# Patient Record
Sex: FEMALE | Race: BLACK OR AFRICAN AMERICAN | NOT HISPANIC OR LATINO | Employment: FULL TIME | URBAN - METROPOLITAN AREA
[De-identification: names, ages, dates, MRNs, and addresses within clinical notes are randomized per-mention and may not be internally consistent; named-entity substitution may affect disease eponyms.]

---

## 2020-11-27 ENCOUNTER — TELEPHONE (OUTPATIENT)
Dept: OBSTETRICS AND GYNECOLOGY | Facility: CLINIC | Age: 40
End: 2020-11-27

## 2020-11-27 ENCOUNTER — HOSPITAL ENCOUNTER (EMERGENCY)
Facility: HOSPITAL | Age: 40
Discharge: HOME | End: 2020-11-27
Attending: EMERGENCY MEDICINE
Payer: MEDICAID

## 2020-11-27 VITALS
OXYGEN SATURATION: 100 % | HEART RATE: 72 BPM | HEIGHT: 70 IN | RESPIRATION RATE: 18 BRPM | TEMPERATURE: 99 F | BODY MASS INDEX: 41.95 KG/M2 | WEIGHT: 293 LBS | DIASTOLIC BLOOD PRESSURE: 79 MMHG | SYSTOLIC BLOOD PRESSURE: 136 MMHG

## 2020-11-27 DIAGNOSIS — N93.9 VAGINAL BLEEDING: Primary | ICD-10-CM

## 2020-11-27 LAB
B-HCG UR QL: NEGATIVE
B-HCG UR QL: NEGATIVE
BACTERIA #/AREA URNS AUTO: ABNORMAL /HPF
BASOPHILS # BLD AUTO: 0.03 K/UL (ref 0–0.2)
BASOPHILS NFR BLD: 0.3 % (ref 0–1.9)
BILIRUB UR QL STRIP: NEGATIVE
CLARITY UR REFRACT.AUTO: ABNORMAL
COLOR UR AUTO: YELLOW
CTP QC/QA: YES
DIFFERENTIAL METHOD: ABNORMAL
EOSINOPHIL # BLD AUTO: 0.1 K/UL (ref 0–0.5)
EOSINOPHIL NFR BLD: 1.5 % (ref 0–8)
ERYTHROCYTE [DISTWIDTH] IN BLOOD BY AUTOMATED COUNT: 16.4 % (ref 11.5–14.5)
GLUCOSE UR QL STRIP: NEGATIVE
HCT VFR BLD AUTO: 30.6 % (ref 37–48.5)
HGB BLD-MCNC: 8.3 G/DL (ref 12–16)
HGB UR QL STRIP: ABNORMAL
HYALINE CASTS UR QL AUTO: 2 /LPF
IMM GRANULOCYTES # BLD AUTO: 0.03 K/UL (ref 0–0.04)
IMM GRANULOCYTES NFR BLD AUTO: 0.3 % (ref 0–0.5)
KETONES UR QL STRIP: NEGATIVE
LEUKOCYTE ESTERASE UR QL STRIP: NEGATIVE
LYMPHOCYTES # BLD AUTO: 1.7 K/UL (ref 1–4.8)
LYMPHOCYTES NFR BLD: 19 % (ref 18–48)
MCH RBC QN AUTO: 21.3 PG (ref 27–31)
MCHC RBC AUTO-ENTMCNC: 27.1 G/DL (ref 32–36)
MCV RBC AUTO: 79 FL (ref 82–98)
MICROSCOPIC COMMENT: ABNORMAL
MONOCYTES # BLD AUTO: 0.8 K/UL (ref 0.3–1)
MONOCYTES NFR BLD: 8.2 % (ref 4–15)
NEUTROPHILS # BLD AUTO: 6.5 K/UL (ref 1.8–7.7)
NEUTROPHILS NFR BLD: 70.7 % (ref 38–73)
NITRITE UR QL STRIP: NEGATIVE
NRBC BLD-RTO: 0 /100 WBC
PH UR STRIP: 6 [PH] (ref 5–8)
PLATELET # BLD AUTO: 374 K/UL (ref 150–350)
PMV BLD AUTO: 10.3 FL (ref 9.2–12.9)
PROT UR QL STRIP: NEGATIVE
RBC # BLD AUTO: 3.9 M/UL (ref 4–5.4)
RBC #/AREA URNS AUTO: >100 /HPF (ref 0–4)
SP GR UR STRIP: 1.02 (ref 1–1.03)
SQUAMOUS #/AREA URNS AUTO: 0 /HPF
URN SPEC COLLECT METH UR: ABNORMAL
WBC # BLD AUTO: 9.18 K/UL (ref 3.9–12.7)

## 2020-11-27 PROCEDURE — 99284 EMERGENCY DEPT VISIT MOD MDM: CPT | Mod: ,,, | Performed by: EMERGENCY MEDICINE

## 2020-11-27 PROCEDURE — 81025 URINE PREGNANCY TEST: CPT | Performed by: PHYSICIAN ASSISTANT

## 2020-11-27 PROCEDURE — 81001 URINALYSIS AUTO W/SCOPE: CPT

## 2020-11-27 PROCEDURE — 81025 URINE PREGNANCY TEST: CPT

## 2020-11-27 PROCEDURE — 85025 COMPLETE CBC W/AUTO DIFF WBC: CPT

## 2020-11-27 PROCEDURE — 99284 PR EMERGENCY DEPT VISIT,LEVEL IV: ICD-10-PCS | Mod: ,,, | Performed by: EMERGENCY MEDICINE

## 2020-11-27 PROCEDURE — 99283 EMERGENCY DEPT VISIT LOW MDM: CPT

## 2020-11-27 NOTE — TELEPHONE ENCOUNTER
I spoke with pt and she states she is a prior pt of Dr. Will (EJ). She says she went to the ER yesterday due to soaking 2+ pads within an hour, her labs came back normal so pt was sent home. She states while under Dr. Will care back in May 2020, she was suppose to get a DnC and IUD insert. Due to Covid she never had it done. I asked pt if she'd like to schedule an appt with Suman but she asked me to send a message first and to have Dr. Will call her. I told her Dr. Will wouldn't receive the msg until next week, when she returns to clinic.         ----- Message from Jazzy Giang sent at 11/27/2020  8:03 AM CST -----  Contact: [phone]  Type:  Needs Medical Advice    Who Called: PT  Reason for Call: pt would like to schedule a New Pt appt for today if Possible for Vaginal Bleeding, pt was a pt of the Dr's at [facility]. Pt states if the Dr can not see her today can the Dr call In something to help stop the Bleeding until she can see the Dr. Pt states she went to the ER Yesterday   Would the patient rather a call back or a response via [facility]?  Call back  Best Call Back Number: [phone]

## 2020-11-27 NOTE — ED PROVIDER NOTES
Encounter Date: 11/27/2020       History     Chief Complaint   Patient presents with    Vaginal Bleeding     seen at [facility] yest, lab was normal, still bleeding      This is a  40-year-old female who presents to the emergency department today with vaginal bleeding.  She states that since her last child was born a couple of years ago she has had ongoing difficulties with heavy prolonged frequent menstrual periods.  She has followed up with an OBGYN for this in the past and has had workup that included ultrasound.  There was discussion of performing AD and C earlier in the year, however that has not yet occurred.  She is here today as she is had particularly worse vaginal bleeding over the past 48 hr.  She is going through multiple tampons and pads per day, passing clots. She was seen yesterday at [facility] where she states she had hematocrit drawn.  I was able to review Care everywhere and see that this was 31.6.   She presents today as she discussed her continued bleeding with the OBGYN nurse who directed her to the ED for further evaluation.  She is not feeling lightheaded.  She has no chest pain or shortness of breath.  No syncopal episodes.        Review of patient's allergies indicates:  No Known Allergies  No past medical history on file.  No past surgical history on file.  No family history on file.  Social History     Tobacco Use    Smoking status: Not on file   Substance Use Topics    Alcohol use: Not on file    Drug use: Not on file     Review of Systems   Constitutional: Negative for chills and fever.   HENT: Negative for congestion, rhinorrhea and sore throat.    Eyes: Negative for visual disturbance.   Respiratory: Negative for chest tightness and shortness of breath.    Cardiovascular: Negative for chest pain.   Gastrointestinal: Negative for abdominal pain, diarrhea, nausea and vomiting.   Genitourinary: Positive for vaginal bleeding. Negative for dysuria.   Musculoskeletal:  Negative for back pain.   Skin: Negative for rash.   Neurological: Negative for syncope and light-headedness.   Psychiatric/Behavioral: Negative for confusion.       Physical Exam     Initial Vitals [11/27/20 1138]   BP Pulse Resp Temp SpO2   (!) 189/94 99 18 98.7 °F (37.1 °C) 100 %      MAP       --         Physical Exam    Vitals reviewed.  Constitutional: She appears well-developed and well-nourished.   HENT:   Head: Normocephalic and atraumatic.   Mouth/Throat: No oropharyngeal exudate.   Eyes: EOM are normal. Pupils are equal, round, and reactive to light.   Neck: Normal range of motion. Neck supple.   Cardiovascular: Normal rate, regular rhythm, normal heart sounds and intact distal pulses. Exam reveals no gallop and no friction rub.    No murmur heard.  Pulmonary/Chest: Breath sounds normal. No respiratory distress. She has no wheezes. She has no rhonchi. She has no rales.   Abdominal: Soft. Bowel sounds are normal. She exhibits no distension. There is no abdominal tenderness. There is no rebound and no guarding.   Genitourinary:    Genitourinary Comments: Pelvic exam chaperoned by nurse reveals vaginal bleeding that is moderate in vault. No lesions seen. No clot at this time. Os closed and normal appearing.     Musculoskeletal: Normal range of motion.      Comments: No CVA tenderness bilaterally.   Skin: Skin is warm and dry. No rash noted.   Psychiatric: She has a normal mood and affect.         ED Course   Procedures  Labs Reviewed   CBC W/ AUTO DIFFERENTIAL - Abnormal; Notable for the following components:       Result Value    RBC 3.90 (*)     Hemoglobin 8.3 (*)     Hematocrit 30.6 (*)     MCV 79 (*)     MCH 21.3 (*)     MCHC 27.1 (*)     RDW 16.4 (*)     Platelets 374 (*)     All other components within normal limits   URINALYSIS, REFLEX TO URINE CULTURE - Abnormal; Notable for the following components:    Appearance, UA Hazy (*)     Occult Blood UA 3+ (*)     All other components within normal limits     Narrative:     Specimen Source->Urine   URINALYSIS MICROSCOPIC - Abnormal; Notable for the following components:    RBC, UA >100 (*)     Bacteria Few (*)     Hyaline Casts, UA 2 (*)     All other components within normal limits    Narrative:     Specimen Source->Urine   PREGNANCY TEST, URINE RAPID    Narrative:     Specimen Source->Urine   POCT URINE PREGNANCY          Imaging Results    None          Medical Decision Making:   History:   Old Medical Records: I decided to obtain old medical records.  Old Records Summarized: records from clinic visits.       <> Summary of Records: As per HPI.  Initial Assessment:   This is a 40-year-old female who presented to the emergency department today with dysfunctional uterine bleeding.  The patient was seen yesterday as well for the same complaints.  I have reviewed her workup at the outside facility in the [facility] everywhere area of epic.  I did note that her hematocrit was 31.6.  Her pregnancy test was negative.  Here on initial exam, I performed a chaperoned pelvic exam to characterize the amount of bleeding present and to assure that there was no clot at the cervical os or anything I could clear that would hopefully stop or decrease the bleeding present.  There was a moderate amount of blood in the vault but no clot at this time.  There is no lesion that I can identify.  I went ahead and anastasia a serial hematocrit with a CBC and begin conversation with her OBGYN Dr. Will.    Differential Diagnosis:   My differential for this patient's dysfunctional uterine bleeding is vast.  She certainly could have a fibroid that could be leading to this bleeding or a thickened endometrium.  There certainly could be hormonal changes that could have led to her irregular bleeding.  It appears upon my conversation with her that she has already begun this workup as an outpatient and has had ultrasounds in the past and a plan for D and C that had not yet occurred.  Thus my goal today was to  assure that her hematocrit was relatively stable and that she was in stable condition for discharge with close follow-up with her OBGYN for next week to determine more definitive action to treat this dysfunctional uterine bleeding.  Clinical Tests:   Lab Tests: Ordered and Reviewed       <> Summary of Lab: Her hct today was 30.6 in comparison to hct of 31.6 yday.   ED Management:  This is a 40-year-old female who presents to the ED, as above, with continued dysfunctional uterine bleeding.  Pregnancy was ruled out yesterday.  Her exam today reveals a moderate amount of bleeding in the vaginal vault with no obvious lesion or source of the bleed that I can find on examination.  Her hematocrit is relatively stable today at 30.6.  I have been in discussion with her OBGYN via secure chat to assure that she is aware that the patient has been in our emergency department today, and that she will be amenable to following up with the patient next week.  She has confirmed that that is the plan.  In the meantime I feel that it is appropriate to discharge the patient.  I do not feel that any further imaging is needed at this time given that she is already amidst a workup for dysfunctional uterine bleeding as an outpatient.  She has been given strict return precautions.  I have asked her in the meantime during this outpatient workup to go ahead and start a prenatal vitamin that his iron containing as directed given her mild anemia, particularly in the setting of the continued vaginal bleeding.  She has voiced understanding.  She is discharged in stable condition.  ED diagnosis:  1.  Acute vaginal bleeding, in the setting of dysfunctional uterine bleeding.  2.  Anemia.                             Clinical Impression:     ICD-10-CM ICD-9-CM   1. Vaginal bleeding  N93.9 623.8                          ED Disposition Condition    Discharge Stable        ED Prescriptions     None        Follow-up Information     Follow up With  Specialties Details Why Contact Info    Melida Will MD Obstetrics and Gynecology Schedule an appointment as soon as possible for a visit  Schedule this for/on Monday. [address] [address] [address]  [phone]                                         Madison Regalado MD  11/28/20 7564

## 2020-11-27 NOTE — ED TRIAGE NOTES
Patient comes into ER with complaints of vaginal bleeding. Patient states that she has been having heavy period of vaginal bleeding since her last child was born in March of 2019. Patient states she saw her OBGYN for this at the beginning of 2020 but due to covid it was cancelled. Patient states she went to [facility] yesterday and they didn't do anything.

## 2020-11-27 NOTE — Clinical Note
"Mona"Ignacia Rosas was seen and treated in our emergency department on 11/27/2020.  She may return to work on 11/30/2020.       If you have any questions or concerns, please don't hesitate to call.      Madison Regalado MD"

## 2020-11-27 NOTE — DISCHARGE INSTRUCTIONS
You are stable for discharge. It is recommended that you take an OTC prenatal vitamin with iron as directed. Follow up with your OB/GYN early next week for further workup.    Our goal in the emergency department is to always give you outstanding care and exceptional service. You may receive a survey by mail or e-mail in the next week regarding your experience in our ED. We would greatly appreciate your completing and returning the survey. Your feedback provides us with a way to recognize our staff who give very good care and it helps us learn how to improve when your experience was below our aspiration of excellence.

## 2020-11-28 ENCOUNTER — NURSE TRIAGE (OUTPATIENT)
Dept: ADMINISTRATIVE | Facility: CLINIC | Age: 40
End: 2020-11-28

## 2020-11-28 NOTE — TELEPHONE ENCOUNTER
Pt reports abnormal vaginal bleeding that increased on Thursday. Reports she was evaluated twice in the ED and has a follow up apt on Thursday with her OB. Bleeding is worse with ambulation.   Advised, per protocol to be seen in the ED for further evaluation.   She verbalizes understanding but I am unsure if she will follow disposition as she has already been evaluated twice. I have given her call back precautions. She would like to see if her apt with OB can be moved up.     Reason for Disposition   SEVERE vaginal bleeding (e.g., soaking 2 pads or tampons per hour and present 2 or more hours; 1 menstrual cup every 2 hours)    Additional Information   Negative: Shock suspected (e.g., cold/pale/clammy skin, too weak to stand, low BP, rapid pulse)   Negative: Difficult to awaken or acting confused (e.g., disoriented, slurred speech)   Negative: Passed out (i.e., lost consciousness, collapsed and was not responding)   Negative: Sounds like a life-threatening emergency to the triager   Negative: SEVERE abdominal pain   Negative: SEVERE dizziness (e.g., unable to stand, requires support to walk, feels like passing out now)    Protocols used: VAGINAL BLEEDING - [id number]

## 2020-11-30 ENCOUNTER — TELEPHONE (OUTPATIENT)
Dept: OBSTETRICS AND GYNECOLOGY | Facility: CLINIC | Age: 40
End: 2020-11-30

## 2020-11-30 NOTE — TELEPHONE ENCOUNTER
Spoke with patient.  Please place patient on the schedule at 1 pm 12/1.  OK to use procedure spot.

## 2020-11-30 NOTE — TELEPHONE ENCOUNTER
----- Message from Christy Alatorre sent at 11/28/2020  7:57 AM CST -----  Contact: pt, [phone] ([phone]  Patient requests to speak with you, would like something prescribed to help her control bleeding. States she's been to ED twice already. [facility] is [facility] in [place]. Please advise.

## 2020-12-01 ENCOUNTER — OFFICE VISIT (OUTPATIENT)
Dept: OBSTETRICS AND GYNECOLOGY | Facility: CLINIC | Age: 40
End: 2020-12-01
Payer: MEDICAID

## 2020-12-01 VITALS
DIASTOLIC BLOOD PRESSURE: 76 MMHG | HEIGHT: 70 IN | SYSTOLIC BLOOD PRESSURE: 126 MMHG | WEIGHT: 293 LBS | BODY MASS INDEX: 41.95 KG/M2

## 2020-12-01 DIAGNOSIS — Z12.31 BREAST CANCER SCREENING BY MAMMOGRAM: ICD-10-CM

## 2020-12-01 DIAGNOSIS — E66.01 CLASS 3 SEVERE OBESITY WITH BODY MASS INDEX (BMI) OF 45.0 TO 49.9 IN ADULT, UNSPECIFIED OBESITY TYPE, UNSPECIFIED WHETHER SERIOUS COMORBIDITY PRESENT: ICD-10-CM

## 2020-12-01 DIAGNOSIS — Z00.00 ROUTINE GENERAL MEDICAL EXAMINATION AT A HEALTH CARE FACILITY: ICD-10-CM

## 2020-12-01 DIAGNOSIS — N93.9 ABNORMAL UTERINE BLEEDING (AUB): ICD-10-CM

## 2020-12-01 DIAGNOSIS — Z01.419 ENCOUNTER FOR ANNUAL ROUTINE GYNECOLOGICAL EXAMINATION: Primary | ICD-10-CM

## 2020-12-01 PROCEDURE — 99214 OFFICE O/P EST MOD 30 MIN: CPT | Mod: PBBFAC,PO | Performed by: OBSTETRICS & GYNECOLOGY

## 2020-12-01 PROCEDURE — 99396 PR PREVENTIVE VISIT,EST,40-64: ICD-10-PCS | Mod: S$PBB,,, | Performed by: OBSTETRICS & GYNECOLOGY

## 2020-12-01 PROCEDURE — 87624 HPV HI-RISK TYP POOLED RSLT: CPT

## 2020-12-01 PROCEDURE — 99999 PR PBB SHADOW E&M-EST. PATIENT-LVL IV: CPT | Mod: PBBFAC,,, | Performed by: OBSTETRICS & GYNECOLOGY

## 2020-12-01 PROCEDURE — 99396 PREV VISIT EST AGE 40-64: CPT | Mod: S$PBB,,, | Performed by: OBSTETRICS & GYNECOLOGY

## 2020-12-01 PROCEDURE — 99999 PR PBB SHADOW E&M-EST. PATIENT-LVL IV: ICD-10-PCS | Mod: PBBFAC,,, | Performed by: OBSTETRICS & GYNECOLOGY

## 2020-12-01 PROCEDURE — 88175 CYTOPATH C/V AUTO FLUID REDO: CPT

## 2020-12-01 NOTE — PATIENT INSTRUCTIONS
Understanding Uterine Bleeding  Your uterine bleeding may be heavy. Or you may have bleeding between periods. These problems may be caused by hormonal imbalance. Or they can be caused by uterine growths, an intrauterine device (IUD), bleeding disorder, or pregnancy.  Hormonal imbalance  Your menstrual cycle is controlled by hormones. The hormones include estrogen and progesterone. Sometimes there is too much or too little of 1 or both of these hormones. This can cause heavy periods. Or it can cause bleeding between periods. Causes of hormonal imbalance can include:  · Hormonal changes in teens and in women nearing menopause  · Diabetes, thyroid disease, or other medical problems  · Obesity  · Stress  · Strenuous exercise  · Anorexia (an eating disorder)  · Pregnancy  Uterine growths  There are different kinds of uterine growths. These include:  · Fibroids. These are round knots of muscle tissue in the uterus.  · Polyps. These are soft tissue growths in the uterine lining. They often hang into the uterus.  · Adenomyosis. This is when the uterine lining grows into the muscle wall.  · Hyperplasia. This is when the uterine lining gets too thick or grows too much.  · Endometrial cancer. This is uncontrolled growth of part of the uterine lining.  Other causes of uterine bleeding  There are other causes of uterine bleeding. These include:  · IUD (intrauterine device). This is a method of birth control. Some IUDs contain hormones.  · Bleeding disorders. This is when the blood can't clot properly.  Treatment  Your health care provider can help diagnose the cause of your bleeding problem. He or she will work then work with you to plan treatment as needed.  Date Last Reviewed: 7/6/2015  © 9615-0025 [employer]. [address] [place] [address], [place] [address]. All rights reserved. This information is not intended as a substitute for professional medical care. Always follow your healthcare professional's  instructions.

## 2020-12-01 NOTE — PROGRESS NOTES
Chief Complaint: Well Woman Exam     HPI:      41 yo  who is known to me presents with complaints of AUB that has been presents for approximately 1 year.  She had previously had an endometrial biopsy performed over 6 months ago.  She planned to have a Mirena IUD placed, but that was delayed due to COVID.     She reports over a 50 pound weight gain since her last delivery in 2019.  She notes that she has eliminated fast food and fried foods from her diet without any significant weight loss.  She is interested in pursuing surgical intervention.     Ms. Rosas is not currently sexually active. She declines STD screening today. Patient does not have regular monthly menses. Patient's last menstrual period was 10/01/2020 (approximate). She is currently using no method for contraception.    Previous Pap:  no abnormalities (No result found)  Previous Mammogram: never had    Gardasil:Has never had     Patient Active Problem List   Diagnosis    Cyst of ovary       History reviewed. No pertinent past medical history.    Past Surgical History:   Procedure Laterality Date    CHOLECYSTECTOMY      OVARIAN CYST REMOVAL         OB History        5    Para   4    Term   3       1    AB   1    Living   4       SAB   1    TAB        Ectopic        Multiple        Live Births   4           Obstetric Comments   Menarche at 11  No abnormal pap  No STDs   x 4, SAB x 1, BB 8#5             ROS:     Review of Systems   Constitutional: Negative for activity change, appetite change and fatigue.   Respiratory: Negative for shortness of breath.    Cardiovascular: Negative for chest pain.   Gastrointestinal: Negative for abdominal pain, constipation and diarrhea.   Endocrine: Negative for cold intolerance and heat intolerance.   Genitourinary: Positive for menstrual irregularity, menstrual problem and vaginal bleeding. Negative for dysuria, frequency, pelvic pain and vaginal discharge.   Integumentary:  Negative  "for breast mass, breast discharge and breast tenderness.   Psychiatric/Behavioral: Negative for dysphoric mood. The patient is not nervous/anxious.    Breast: Negative for mass and tenderness      Physical Exam:      PHYSICAL EXAM:  /76   Ht 5' 9.5" (1.765 m)   Wt (!) 153.4 kg (338 lb 3.2 oz)   LMP 10/01/2020 (Approximate)   BMI 49.23 kg/m²   Body mass index is 49.23 kg/m².     APPEARANCE: Well nourished, well developed, in no acute distress.  PSYCH: Appropriate mood and affect.  SKIN: No acne or hirsutism  NECK: Neck symmetric without masses or thyromegaly  NODES: No inguinal, axillary, or supraclavicular lymph node enlargement  CHEST: Normal respiratory effort.  ABDOMEN: Soft.  No tenderness or masses.   BREASTS: Symmetrical, no skin changes or visible lesions.  No palpable masses or nipple discharge bilaterally.  PELVIC: Normal external genitalia without lesions.  Normal hair distribution.  Adequate perineal body, normal urethral meatus.  Vagina moist and well rugated without lesions or discharge.  Cervix pink, without lesions, discharge or tenderness.  No significant cystocele or rectocele.  Bimanual exam shows uterus to be normal size, regular, mobile and nontender.  Adnexa without masses or tenderness.    EXTREMITIES: No edema.    Assessment:     1. Encounter for annual routine gynecological examination  Liquid-Based Pap Smear, Screening    HPV High Risk Genotypes, PCR   2. Abnormal uterine bleeding (AUB)  CBC Auto Differential    TSH    US Pelvis Comp with Transvag NON-OB (xpd    Device Authorization Order    CANCELED: POCT urine pregnancy   3. Routine general medical examination at a health care facility  Ambulatory referral/consult to Internal Medicine   4. Breast cancer screening by mammogram  Mammo Digital Screening Bilat w/ Vinny   5. Class 3 severe obesity with body mass index (BMI) of 45.0 to 49.9 in adult, unspecified obesity type, unspecified whether serious comorbidity present  Ambulatory " referral/consult to Bariatric Surgery         Plan:     1. Clinical breast exam performed.  2. Pap w HPV today.  3. AUB - CBC, TSH, UPT reviewed.  Request records from [facility].  If US remains normal, plan for Mirena IUD insertion upon approval.  4. Mammogram ordered.  5. Obesity - patient interested in being evaluated for bariatric surgery, referral placed.  6. PCP referral placed.  Follow up for IUD placement.    Counseling:     Patient was counseled today on current ASCCP pap guidelines, the recommendation for yearly pelvic exams, healthy diet and exercise routines, breast self awareness and annual mammograms. She is to see her PCP for other health maintenance.       Use of the [employer] Patient Portal discussed and encouraged during today's visit.       Melida Will MD

## 2020-12-02 DIAGNOSIS — D62 ACUTE ON CHRONIC BLOOD LOSS ANEMIA: Primary | ICD-10-CM

## 2020-12-02 RX ORDER — FERROUS SULFATE 325(65) MG
325 TABLET, DELAYED RELEASE (ENTERIC COATED) ORAL 3 TIMES DAILY
Qty: 90 TABLET | Refills: 11 | Status: SHIPPED | OUTPATIENT
Start: 2020-12-02 | End: 2021-12-02

## 2020-12-09 LAB
HPV HR 12 DNA SPEC QL NAA+PROBE: NEGATIVE
HPV16 AG SPEC QL: NEGATIVE
HPV18 DNA SPEC QL NAA+PROBE: NEGATIVE

## 2020-12-10 ENCOUNTER — TELEPHONE (OUTPATIENT)
Dept: OBSTETRICS AND GYNECOLOGY | Facility: CLINIC | Age: 40
End: 2020-12-10

## 2021-01-13 LAB
FINAL PATHOLOGIC DIAGNOSIS: NORMAL
Lab: NORMAL

## 2021-02-01 ENCOUNTER — TELEPHONE (OUTPATIENT)
Dept: OBSTETRICS AND GYNECOLOGY | Facility: CLINIC | Age: 41
End: 2021-02-01

## 2021-03-10 ENCOUNTER — PROCEDURE VISIT (OUTPATIENT)
Dept: OBSTETRICS AND GYNECOLOGY | Facility: CLINIC | Age: 41
End: 2021-03-10
Payer: MEDICAID

## 2021-03-10 VITALS
HEIGHT: 70 IN | DIASTOLIC BLOOD PRESSURE: 70 MMHG | SYSTOLIC BLOOD PRESSURE: 116 MMHG | WEIGHT: 293 LBS | BODY MASS INDEX: 41.95 KG/M2

## 2021-03-10 DIAGNOSIS — N90.7 INCLUSION CYST OF VULVA: ICD-10-CM

## 2021-03-10 DIAGNOSIS — Z30.430 ENCOUNTER FOR IUD INSERTION: Primary | ICD-10-CM

## 2021-03-10 DIAGNOSIS — N93.9 ABNORMAL UTERINE BLEEDING (AUB): ICD-10-CM

## 2021-03-10 LAB
B-HCG UR QL: NEGATIVE
CTP QC/QA: YES

## 2021-03-10 PROCEDURE — 58100 BIOPSY OF UTERUS LINING: CPT | Mod: PBBFAC,PO | Performed by: OBSTETRICS & GYNECOLOGY

## 2021-03-10 PROCEDURE — 88305 TISSUE EXAM BY PATHOLOGIST: CPT | Performed by: PATHOLOGY

## 2021-03-10 PROCEDURE — 56405 PR I&D OF VULVA/PERINEUM ABSCESS: ICD-10-PCS | Mod: S$PBB,,, | Performed by: OBSTETRICS & GYNECOLOGY

## 2021-03-10 PROCEDURE — 81025 URINE PREGNANCY TEST: CPT | Mod: PBBFAC,PO | Performed by: OBSTETRICS & GYNECOLOGY

## 2021-03-10 PROCEDURE — 58300 INSERT INTRAUTERINE DEVICE: CPT | Mod: PBBFAC,PO | Performed by: OBSTETRICS & GYNECOLOGY

## 2021-03-10 PROCEDURE — 58300 INSERT INTRAUTERINE DEVICE: CPT | Mod: S$PBB,,, | Performed by: OBSTETRICS & GYNECOLOGY

## 2021-03-10 PROCEDURE — 99499 UNLISTED E&M SERVICE: CPT | Mod: S$PBB,,, | Performed by: OBSTETRICS & GYNECOLOGY

## 2021-03-10 PROCEDURE — 88305 TISSUE EXAM BY PATHOLOGIST: CPT | Mod: 26,,, | Performed by: PATHOLOGY

## 2021-03-10 PROCEDURE — 88305 TISSUE EXAM BY PATHOLOGIST: ICD-10-PCS | Mod: 26,,, | Performed by: PATHOLOGY

## 2021-03-10 PROCEDURE — 58100 BIOPSY OF UTERUS LINING: CPT | Mod: 59,S$PBB,, | Performed by: OBSTETRICS & GYNECOLOGY

## 2021-03-10 PROCEDURE — 58100 PR BIOPSY OF UTERUS LINING: ICD-10-PCS | Mod: 59,S$PBB,, | Performed by: OBSTETRICS & GYNECOLOGY

## 2021-03-10 PROCEDURE — 56405 I&D VULVA/PERINEAL ABSCESS: CPT | Mod: PBBFAC,PO | Performed by: OBSTETRICS & GYNECOLOGY

## 2021-03-10 PROCEDURE — 56405 I&D VULVA/PERINEAL ABSCESS: CPT | Mod: S$PBB,,, | Performed by: OBSTETRICS & GYNECOLOGY

## 2021-03-10 PROCEDURE — 58300 PR INSERT INTRAUTERINE DEVICE: ICD-10-PCS | Mod: S$PBB,,, | Performed by: OBSTETRICS & GYNECOLOGY

## 2021-03-10 PROCEDURE — 99499 NO LOS: ICD-10-PCS | Mod: S$PBB,,, | Performed by: OBSTETRICS & GYNECOLOGY

## 2021-03-10 RX ADMIN — LEVONORGESTREL 1 INTRA UTERINE DEVICE: 52 INTRAUTERINE DEVICE INTRAUTERINE at 05:03

## 2021-03-15 LAB
FINAL PATHOLOGIC DIAGNOSIS: NORMAL
GROSS: NORMAL

## 2021-04-16 ENCOUNTER — PATIENT MESSAGE (OUTPATIENT)
Dept: RESEARCH | Facility: HOSPITAL | Age: 41
End: 2021-04-16

## 2021-04-29 ENCOUNTER — OFFICE VISIT (OUTPATIENT)
Dept: OBSTETRICS AND GYNECOLOGY | Facility: CLINIC | Age: 41
End: 2021-04-29
Payer: MEDICAID

## 2021-04-29 VITALS
HEIGHT: 70 IN | BODY MASS INDEX: 41.95 KG/M2 | DIASTOLIC BLOOD PRESSURE: 98 MMHG | SYSTOLIC BLOOD PRESSURE: 142 MMHG | WEIGHT: 293 LBS

## 2021-04-29 DIAGNOSIS — Z30.431 IUD CHECK UP: Primary | ICD-10-CM

## 2021-04-29 PROCEDURE — 99999 PR PBB SHADOW E&M-EST. PATIENT-LVL II: ICD-10-PCS | Mod: PBBFAC,,, | Performed by: OBSTETRICS & GYNECOLOGY

## 2021-04-29 PROCEDURE — 99212 OFFICE O/P EST SF 10 MIN: CPT | Mod: PBBFAC,PO | Performed by: OBSTETRICS & GYNECOLOGY

## 2021-04-29 PROCEDURE — 99213 PR OFFICE/OUTPT VISIT, EST, LEVL III, 20-29 MIN: ICD-10-PCS | Mod: S$PBB,,, | Performed by: OBSTETRICS & GYNECOLOGY

## 2021-04-29 PROCEDURE — 99213 OFFICE O/P EST LOW 20 MIN: CPT | Mod: S$PBB,,, | Performed by: OBSTETRICS & GYNECOLOGY

## 2021-04-29 PROCEDURE — 99999 PR PBB SHADOW E&M-EST. PATIENT-LVL II: CPT | Mod: PBBFAC,,, | Performed by: OBSTETRICS & GYNECOLOGY

## 2021-05-03 DIAGNOSIS — D62 ACUTE ON CHRONIC BLOOD LOSS ANEMIA: Primary | ICD-10-CM

## 2021-05-21 ENCOUNTER — TELEPHONE (OUTPATIENT)
Dept: OBSTETRICS AND GYNECOLOGY | Facility: CLINIC | Age: 41
End: 2021-05-21

## 2022-03-10 ENCOUNTER — TELEPHONE (OUTPATIENT)
Dept: OBSTETRICS AND GYNECOLOGY | Facility: CLINIC | Age: 42
End: 2022-03-10
Payer: MEDICAID

## 2022-03-10 NOTE — TELEPHONE ENCOUNTER
Called to speak with patient in regards to complaints of heavy bleeding. Patient is having heavy bleeding. She does have a history of heavy bleeding. Mirena placed. Bleeding has slacked up but around the time of her period it becomes excessive. She only gets a period every 3-4 months. Patient is requesting the doctor. I let her know the doctor is out for the day. Offered the patient an appointment with another provider. Patient declined. I let patient know she should be seen in ER. Patient declined, but said she understood that was our recommendation.

## 2022-03-11 ENCOUNTER — TELEPHONE (OUTPATIENT)
Dept: OBSTETRICS AND GYNECOLOGY | Facility: CLINIC | Age: 42
End: 2022-03-11
Payer: MEDICAID

## 2022-03-11 DIAGNOSIS — Z97.5 BREAKTHROUGH BLEEDING WITH IUD: Primary | ICD-10-CM

## 2022-03-11 DIAGNOSIS — N92.1 BREAKTHROUGH BLEEDING WITH IUD: Primary | ICD-10-CM

## 2022-03-11 NOTE — TELEPHONE ENCOUNTER
Called patient to get her scheduled for US and labs due to heavy bleeding. Patient expressed understanding. Will be in the week after testing to talk about results and next steps.

## 2022-03-11 NOTE — TELEPHONE ENCOUNTER
----- Message from Betzy Moralez MA sent at 3/10/2022  9:37 AM CST -----  Regarding: FW: Patient call back  Patient has complaints of heavy bleeding. Bleeding started 03/10/2022. Started with bad cramping. Get periods every 4 - 5 months with mirena and they are extreme like this when they come. Worse with cough and sneezing. Worse with standing and moving. When she is laying down she is fine but when she stands and moves she bleeds a lot. There are clots. She uses tampon, pad and depends in efforts not to mess up clothing. Patient declined going through more than one pad in one hour for two - three hours, but says its because she tries to stay in one spot.   Hx of heavy bleeding. Mirena placed. Bleeding has started again.       - I let patient know with what she was saying we would suggest the ER. Patient declined saying she would like to wait and speak/see you instead.       ----- Message -----  From: Flora Matthews  Sent: 3/10/2022   8:12 AM CST  To: [facility] Staff  Subject: Patient call back                                Who called:GERARDO TITA LUZ MARIA [[id number]]    What is the request in detail: Patient is requesting a call back. Patient states she is having heavy bleeding and would like carolyn ppt as soon as possible. She would only like to see Dr. Will. Attempted to schedule, no appts available. She would like to further discuss.   Please advise.    Can the clinic reply by [facility]? No    Best call back number: [phone]    Additional Information: N/A

## 2022-03-15 ENCOUNTER — HOSPITAL ENCOUNTER (OUTPATIENT)
Dept: RADIOLOGY | Facility: HOSPITAL | Age: 42
Discharge: HOME | End: 2022-03-15
Attending: OBSTETRICS & GYNECOLOGY
Payer: MEDICAID

## 2022-03-15 ENCOUNTER — TELEPHONE (OUTPATIENT)
Dept: OBSTETRICS AND GYNECOLOGY | Facility: CLINIC | Age: 42
End: 2022-03-15
Payer: MEDICAID

## 2022-03-15 DIAGNOSIS — Z97.5 BREAKTHROUGH BLEEDING WITH IUD: ICD-10-CM

## 2022-03-15 DIAGNOSIS — N92.1 BREAKTHROUGH BLEEDING WITH IUD: ICD-10-CM

## 2022-03-15 PROCEDURE — 76856 US EXAM PELVIC COMPLETE: CPT | Mod: 26,,, | Performed by: RADIOLOGY

## 2022-03-15 PROCEDURE — 76856 US PELVIS COMP WITH TRANSVAG NON-OB (XPD): ICD-10-PCS | Mod: 26,,, | Performed by: RADIOLOGY

## 2022-03-15 PROCEDURE — 76830 TRANSVAGINAL US NON-OB: CPT | Mod: TC

## 2022-03-15 PROCEDURE — 76830 US PELVIS COMP WITH TRANSVAG NON-OB (XPD): ICD-10-PCS | Mod: 26,,, | Performed by: RADIOLOGY

## 2022-03-15 PROCEDURE — 76830 TRANSVAGINAL US NON-OB: CPT | Mod: 26,,, | Performed by: RADIOLOGY

## 2022-03-23 ENCOUNTER — OFFICE VISIT (OUTPATIENT)
Dept: OBSTETRICS AND GYNECOLOGY | Facility: CLINIC | Age: 42
End: 2022-03-23
Payer: MEDICAID

## 2022-03-23 VITALS
HEIGHT: 69 IN | WEIGHT: 293 LBS | BODY MASS INDEX: 43.4 KG/M2 | DIASTOLIC BLOOD PRESSURE: 94 MMHG | SYSTOLIC BLOOD PRESSURE: 152 MMHG

## 2022-03-23 DIAGNOSIS — N93.9 ABNORMAL UTERINE BLEEDING (AUB): ICD-10-CM

## 2022-03-23 DIAGNOSIS — Z30.011 ENCOUNTER FOR INITIAL PRESCRIPTION OF CONTRACEPTIVE PILLS: ICD-10-CM

## 2022-03-23 DIAGNOSIS — T83.32XD INTRAUTERINE CONTRACEPTIVE DEVICE THREADS LOST, SUBSEQUENT ENCOUNTER: Primary | ICD-10-CM

## 2022-03-23 DIAGNOSIS — Z12.31 BREAST CANCER SCREENING BY MAMMOGRAM: ICD-10-CM

## 2022-03-23 DIAGNOSIS — E61.1 IRON DEFICIENCY: ICD-10-CM

## 2022-03-23 LAB
B-HCG UR QL: NEGATIVE
CTP QC/QA: YES

## 2022-03-23 PROCEDURE — 1159F MED LIST DOCD IN RCRD: CPT | Mod: CPTII,S$GLB,, | Performed by: OBSTETRICS & GYNECOLOGY

## 2022-03-23 PROCEDURE — 3008F BODY MASS INDEX DOCD: CPT | Mod: CPTII,S$GLB,, | Performed by: OBSTETRICS & GYNECOLOGY

## 2022-03-23 PROCEDURE — 99215 OFFICE O/P EST HI 40 MIN: CPT | Mod: S$GLB,,, | Performed by: OBSTETRICS & GYNECOLOGY

## 2022-03-23 PROCEDURE — 3077F PR MOST RECENT SYSTOLIC BLOOD PRESSURE >= 140 MM HG: ICD-10-PCS | Mod: CPTII,S$GLB,, | Performed by: OBSTETRICS & GYNECOLOGY

## 2022-03-23 PROCEDURE — 3077F SYST BP >= 140 MM HG: CPT | Mod: CPTII,S$GLB,, | Performed by: OBSTETRICS & GYNECOLOGY

## 2022-03-23 PROCEDURE — 3080F PR MOST RECENT DIASTOLIC BLOOD PRESSURE >= 90 MM HG: ICD-10-PCS | Mod: CPTII,S$GLB,, | Performed by: OBSTETRICS & GYNECOLOGY

## 2022-03-23 PROCEDURE — 81025 POCT URINE PREGNANCY: ICD-10-PCS | Mod: S$GLB,,, | Performed by: OBSTETRICS & GYNECOLOGY

## 2022-03-23 PROCEDURE — 3080F DIAST BP >= 90 MM HG: CPT | Mod: CPTII,S$GLB,, | Performed by: OBSTETRICS & GYNECOLOGY

## 2022-03-23 PROCEDURE — 81025 URINE PREGNANCY TEST: CPT | Mod: S$GLB,,, | Performed by: OBSTETRICS & GYNECOLOGY

## 2022-03-23 PROCEDURE — 3008F PR BODY MASS INDEX (BMI) DOCUMENTED: ICD-10-PCS | Mod: CPTII,S$GLB,, | Performed by: OBSTETRICS & GYNECOLOGY

## 2022-03-23 PROCEDURE — 1159F PR MEDICATION LIST DOCUMENTED IN MEDICAL RECORD: ICD-10-PCS | Mod: CPTII,S$GLB,, | Performed by: OBSTETRICS & GYNECOLOGY

## 2022-03-23 PROCEDURE — 99215 PR OFFICE/OUTPT VISIT, EST, LEVL V, 40-54 MIN: ICD-10-PCS | Mod: S$GLB,,, | Performed by: OBSTETRICS & GYNECOLOGY

## 2022-03-23 RX ORDER — DOCUSATE SODIUM 100 MG/1
100 CAPSULE, LIQUID FILLED ORAL DAILY PRN
Qty: 90 CAPSULE | Refills: 1 | Status: SHIPPED | OUTPATIENT
Start: 2022-03-23 | End: 2022-06-03

## 2022-03-23 RX ORDER — NORETHINDRONE ACETATE AND ETHINYL ESTRADIOL .02; 1 MG/1; MG/1
1 TABLET ORAL DAILY
Qty: 84 TABLET | Refills: 3 | Status: SHIPPED | OUTPATIENT
Start: 2022-03-23 | End: 2023-02-28 | Stop reason: SDUPTHER

## 2022-03-23 RX ORDER — TRANEXAMIC ACID 650 MG/1
1300 TABLET ORAL 3 TIMES DAILY
Qty: 90 TABLET | Refills: 3 | Status: SHIPPED | OUTPATIENT
Start: 2022-03-23 | End: 2022-08-16 | Stop reason: ALTCHOICE

## 2022-03-23 RX ORDER — FERROUS SULFATE 325(65) MG
325 TABLET, DELAYED RELEASE (ENTERIC COATED) ORAL
Qty: 90 TABLET | Refills: 1 | Status: SHIPPED | OUTPATIENT
Start: 2022-03-23 | End: 2022-06-03

## 2022-03-23 NOTE — PROGRESS NOTES
"Subjective:       Patient ID: Mona Rosas is a 41 y.o. female.    Chief Complaint:  Menorrhagia      History of Present Illness  40 yo  presents for results.  Patient with history of heavy menses that was well controlled with Mirena IUD for approximately 10 months until two weeks ago when she began experiencing severe dysmenorrhea and heavy bleeding.  Reports using heavy pads, a tampon, and men's Depends to control flow.  Denies lightheadedness or dizziness.  Currently bleeding has resolved.       Patient Active Problem List   Diagnosis    Cyst of ovary       Past Medical History:   Diagnosis Date    Class 3 severe obesity due to excess calories without serious comorbidity with body mass index (BMI) of 50.0 to 59.9 in adult        Past Surgical History:   Procedure Laterality Date    CHOLECYSTECTOMY      OVARIAN CYST REMOVAL         OB History    Para Term  AB Living   5 4 3 1 1 4   SAB IAB Ectopic Multiple Live Births   1       4      # Outcome Date GA Lbr Khanh/2nd Weight Sex Delivery Anes PTL Lv   5  19 36w0d   F Vag-Spont   QAMAR   4 Term 09    F Vag-Spont   QAMAR   3 Term 02    M Vag-Spont   QAMAR   2 Term 98    M Vag-Spont   QAMAR   1 SAB               Obstetric Comments   Menarche at 11   No abnormal pap   No STDs    x 4, SAB x 1, BB 8#5       No LMP recorded (lmp unknown). (Menstrual status: Birth Control).   Date of Last Pap: 2021       Objective:   BP (!) 152/94   Ht 5' 9" (1.753 m)   Wt (!) 156 kg (343 lb 14.7 oz)   LMP  (LMP Unknown)   BMI 50.79 kg/m²   Body mass index is 50.79 kg/m².    APPEARANCE: Well nourished, well developed, in no acute distress.  PSYCH: Appropriate mood and affect.  SKIN: No acne or hirsutism  NECK: Neck symmetric without masses or thyromegaly  NODES: No inguinal, axillary, or supraclavicular lymph node enlargement  ABDOMEN: Soft.  No tenderness or masses.    CARDIOVASCULAR: No edema of peripheral " extremities  PELVIC: Deferred     UPT negative    Results for orders placed or performed in visit on 03/15/22   CBC Auto Differential   Result Value Ref Range    WBC 6.73 3.90 - 12.70 K/uL    RBC 4.35 4.00 - 5.40 M/uL    Hemoglobin 10.4 (L) 12.0 - 16.0 g/dL    Hematocrit 35.5 (L) 37.0 - 48.5 %    MCV 82 82 - 98 fL    MCH 23.9 (L) 27.0 - 31.0 pg    MCHC 29.3 (L) 32.0 - 36.0 g/dL    RDW 16.5 (H) 11.5 - 14.5 %    Platelets 315 150 - 450 K/uL    MPV 10.5 9.2 - 12.9 fL    Immature Granulocytes 0.4 0.0 - 0.5 %    Gran # (ANC) 3.9 1.8 - 7.7 K/uL    Immature Grans (Abs) 0.03 0.00 - 0.04 K/uL    Lymph # 2.2 1.0 - 4.8 K/uL    Mono # 0.5 0.3 - 1.0 K/uL    Eos # 0.1 0.0 - 0.5 K/uL    Baso # 0.03 0.00 - 0.20 K/uL    nRBC 0 0 /100 WBC    Gran % 58.6 38.0 - 73.0 %    Lymph % 32.8 18.0 - 48.0 %    Mono % 7.1 4.0 - 15.0 %    Eosinophil % 0.7 0.0 - 8.0 %    Basophil % 0.4 0.0 - 1.9 %    Differential Method Automated    Ferritin   Result Value Ref Range    Ferritin 13 (L) 20.0 - 300.0 ng/mL   Iron and TIBC   Result Value Ref Range    Iron 44 30 - 160 ug/dL    Transferrin 304 200 - 375 mg/dL    TIBC 450 250 - 450 ug/dL    Saturated Iron 10 (L) 20 - 50 %   CBC Auto Differential   Result Value Ref Range    WBC 6.73 3.90 - 12.70 K/uL    RBC 4.35 4.00 - 5.40 M/uL    Hemoglobin 10.4 (L) 12.0 - 16.0 g/dL    Hematocrit 35.5 (L) 37.0 - 48.5 %    MCV 82 82 - 98 fL    MCH 23.9 (L) 27.0 - 31.0 pg    MCHC 29.3 (L) 32.0 - 36.0 g/dL    RDW 16.5 (H) 11.5 - 14.5 %    Platelets 315 150 - 450 K/uL    MPV 10.5 9.2 - 12.9 fL    Immature Granulocytes 0.4 0.0 - 0.5 %    Gran # (ANC) 3.9 1.8 - 7.7 K/uL    Immature Grans (Abs) 0.03 0.00 - 0.04 K/uL    Lymph # 2.2 1.0 - 4.8 K/uL    Mono # 0.5 0.3 - 1.0 K/uL    Eos # 0.1 0.0 - 0.5 K/uL    Baso # 0.03 0.00 - 0.20 K/uL    nRBC 0 0 /100 WBC    Gran % 58.6 38.0 - 73.0 %    Lymph % 32.8 18.0 - 48.0 %    Mono % 7.1 4.0 - 15.0 %    Eosinophil % 0.7 0.0 - 8.0 %    Basophil % 0.4 0.0 - 1.9 %    Differential Method  Automated    TSH   Result Value Ref Range    TSH 1.507 0.400 - 4.000 uIU/mL     Results for orders placed during the hospital encounter of 03/15/22    US Pelvis Comp with Transvag NON-OB (xpd    Narrative  EXAMINATION:  US PELVIS COMP WITH TRANSVAG NON-OB (XPD)    CLINICAL HISTORY:  Excessive and frequent menstruation with irregular cycle    TECHNIQUE:  Transabdominal sonography of the pelvis was performed, followed by transvaginal sonography to better evaluate the uterus and ovaries.    COMPARISON:  Pelvic ultrasound 12/01/2020.    FINDINGS:  Uterus:  Size: 10.7 x 6 x 6.6 cm  Masses: None  Endometrium: Normal in this pre menopausal patient, measuring 3 mm.    There are few nabothian cysts.  There is a thin linear echogenic structure in the lower uterus/cervix seen on trans abdominal images.  This likely represents the endocervical canal.  No intrauterine device is visualized on transabdominal or transvaginal images.    Right ovary:  Size: 3.8 x 3.1 x 4.6 cm  Appearance: Normal    Vascular flow: Normal.  Left ovary:  Surgically removed.    Free Fluid:  None.    Impression  Intrauterine device is not visualized.  Prior left oophorectomy    This report was flagged in Epic as abnormal.    Electronically signed by resident: Tk Hall  Date:    03/15/2022  Time:    11:49    Electronically signed by: Berlin Man MD  Date:    03/15/2022  Time:    13:01    Assessment/ Plan:     1. Intrauterine contraceptive device threads lost, subsequent encounter  X-Ray Pelvis Complete min 3 views    X-Ray Abdomen AP and Lateral    POCT urine pregnancy   2. Breast cancer screening by mammogram  Mammo Digital Screening Bilat w/ Vinny   3. Abnormal uterine bleeding (AUB)  tranexamic acid (LYSTEDA) 650 mg tablet    ferrous sulfate 325 (65 FE) MG EC tablet    docusate sodium (COLACE) 100 MG capsule    Ferritin    CBC Auto Differential   4. Encounter for initial prescription of contraceptive pills  norethindrone-ethinyl estradiol  (MICROGESTIN 1/20) 1-20 mg-mcg per tablet   5. Iron deficiency       Reviewed US results.    Abdomen and pelvic xray.   UPT negative.  Contraception counseling.  Start AURA.  Abstain x 2 weeks.   AUB - Trial of AURA.  If refractory, Lysteda rx sent to pharmacy.   Iron deficiency - Iron and colace daily. Repeat labs in 2 months.     Follow up in about 3 months (around 6/23/2022).      I spent a total of 54 minutes on the day of the visit.  This includes face to face time and non-face to face time preparing to see the patient (eg, review of tests), obtaining and/or reviewing separately obtained history, documenting clinical information in the electronic or other health record, independently interpreting results and communicating results to the patient/family/caregiver, or care coordinator.          Melida Will MD  [facility]  03/23/2022

## 2022-03-24 ENCOUNTER — HOSPITAL ENCOUNTER (OUTPATIENT)
Dept: RADIOLOGY | Facility: OTHER | Age: 42
Discharge: HOME | End: 2022-03-24
Attending: OBSTETRICS & GYNECOLOGY
Payer: MEDICAID

## 2022-03-24 DIAGNOSIS — T83.32XD INTRAUTERINE CONTRACEPTIVE DEVICE THREADS LOST, SUBSEQUENT ENCOUNTER: ICD-10-CM

## 2022-03-24 PROCEDURE — 74019 RADEX ABDOMEN 2 VIEWS: CPT | Mod: 26,,, | Performed by: RADIOLOGY

## 2022-03-24 PROCEDURE — 74019 RADEX ABDOMEN 2 VIEWS: CPT | Mod: TC,FY

## 2022-03-24 PROCEDURE — 74019 XR ABDOMEN AP AND LATERAL: ICD-10-PCS | Mod: 26,,, | Performed by: RADIOLOGY

## 2022-03-30 ENCOUNTER — TELEPHONE (OUTPATIENT)
Dept: OBSTETRICS AND GYNECOLOGY | Facility: CLINIC | Age: 42
End: 2022-03-30
Payer: MEDICAID

## 2022-03-31 ENCOUNTER — HOSPITAL ENCOUNTER (EMERGENCY)
Facility: OTHER | Age: 42
Discharge: HOME | End: 2022-03-31
Attending: EMERGENCY MEDICINE
Payer: MEDICAID

## 2022-03-31 VITALS
WEIGHT: 293 LBS | DIASTOLIC BLOOD PRESSURE: 84 MMHG | HEIGHT: 69 IN | TEMPERATURE: 99 F | OXYGEN SATURATION: 99 % | HEART RATE: 72 BPM | BODY MASS INDEX: 43.4 KG/M2 | SYSTOLIC BLOOD PRESSURE: 186 MMHG | RESPIRATION RATE: 18 BRPM

## 2022-03-31 DIAGNOSIS — R07.89 ATYPICAL CHEST PAIN: Primary | ICD-10-CM

## 2022-03-31 DIAGNOSIS — J06.9 VIRAL URI WITH COUGH: ICD-10-CM

## 2022-03-31 DIAGNOSIS — R07.9 CHEST PAIN: ICD-10-CM

## 2022-03-31 DIAGNOSIS — R03.0 BLOOD PRESSURE ELEVATED WITHOUT HISTORY OF HTN: ICD-10-CM

## 2022-03-31 LAB
ALBUMIN SERPL BCP-MCNC: 3.7 G/DL (ref 3.5–5.2)
ALP SERPL-CCNC: 93 U/L (ref 55–135)
ALT SERPL W/O P-5'-P-CCNC: 30 U/L (ref 10–44)
ANION GAP SERPL CALC-SCNC: 16 MMOL/L (ref 8–16)
AST SERPL-CCNC: 28 U/L (ref 10–40)
BASOPHILS # BLD AUTO: 0.03 K/UL (ref 0–0.2)
BASOPHILS NFR BLD: 0.3 % (ref 0–1.9)
BILIRUB SERPL-MCNC: 0.4 MG/DL (ref 0.1–1)
BNP SERPL-MCNC: 16 PG/ML (ref 0–99)
BUN SERPL-MCNC: 7 MG/DL (ref 6–20)
CALCIUM SERPL-MCNC: 8.8 MG/DL (ref 8.7–10.5)
CHLORIDE SERPL-SCNC: 106 MMOL/L (ref 95–110)
CO2 SERPL-SCNC: 22 MMOL/L (ref 23–29)
CREAT SERPL-MCNC: 0.7 MG/DL (ref 0.5–1.4)
DIFFERENTIAL METHOD: ABNORMAL
EOSINOPHIL # BLD AUTO: 0.1 K/UL (ref 0–0.5)
EOSINOPHIL NFR BLD: 0.7 % (ref 0–8)
ERYTHROCYTE [DISTWIDTH] IN BLOOD BY AUTOMATED COUNT: 16.8 % (ref 11.5–14.5)
EST. GFR  (AFRICAN AMERICAN): >60 ML/MIN/1.73 M^2
EST. GFR  (NON AFRICAN AMERICAN): >60 ML/MIN/1.73 M^2
GLUCOSE SERPL-MCNC: 89 MG/DL (ref 70–110)
HCT VFR BLD AUTO: 38.4 % (ref 37–48.5)
HGB BLD-MCNC: 11.3 G/DL (ref 12–16)
IMM GRANULOCYTES # BLD AUTO: 0.03 K/UL (ref 0–0.04)
IMM GRANULOCYTES NFR BLD AUTO: 0.3 % (ref 0–0.5)
LYMPHOCYTES # BLD AUTO: 1.5 K/UL (ref 1–4.8)
LYMPHOCYTES NFR BLD: 16.7 % (ref 18–48)
MCH RBC QN AUTO: 24 PG (ref 27–31)
MCHC RBC AUTO-ENTMCNC: 29.4 G/DL (ref 32–36)
MCV RBC AUTO: 82 FL (ref 82–98)
MONOCYTES # BLD AUTO: 0.8 K/UL (ref 0.3–1)
MONOCYTES NFR BLD: 8.8 % (ref 4–15)
NEUTROPHILS # BLD AUTO: 6.6 K/UL (ref 1.8–7.7)
NEUTROPHILS NFR BLD: 73.2 % (ref 38–73)
NRBC BLD-RTO: 0 /100 WBC
PLATELET # BLD AUTO: 305 K/UL (ref 150–450)
PMV BLD AUTO: 9.7 FL (ref 9.2–12.9)
POTASSIUM SERPL-SCNC: 4.2 MMOL/L (ref 3.5–5.1)
PROT SERPL-MCNC: 7.8 G/DL (ref 6–8.4)
RBC # BLD AUTO: 4.7 M/UL (ref 4–5.4)
SODIUM SERPL-SCNC: 144 MMOL/L (ref 136–145)
TROPONIN I SERPL DL<=0.01 NG/ML-MCNC: <0.006 NG/ML (ref 0–0.03)
TROPONIN I SERPL DL<=0.01 NG/ML-MCNC: <0.006 NG/ML (ref 0–0.03)
WBC # BLD AUTO: 8.97 K/UL (ref 3.9–12.7)

## 2022-03-31 PROCEDURE — 85025 COMPLETE CBC W/AUTO DIFF WBC: CPT | Performed by: NURSE PRACTITIONER

## 2022-03-31 PROCEDURE — 84484 ASSAY OF TROPONIN QUANT: CPT | Mod: 91 | Performed by: NURSE PRACTITIONER

## 2022-03-31 PROCEDURE — 36415 COLL VENOUS BLD VENIPUNCTURE: CPT | Performed by: NURSE PRACTITIONER

## 2022-03-31 PROCEDURE — 93005 ELECTROCARDIOGRAM TRACING: CPT

## 2022-03-31 PROCEDURE — 93010 ELECTROCARDIOGRAM REPORT: CPT | Mod: ,,, | Performed by: INTERNAL MEDICINE

## 2022-03-31 PROCEDURE — 99285 EMERGENCY DEPT VISIT HI MDM: CPT | Mod: 25

## 2022-03-31 PROCEDURE — 83880 ASSAY OF NATRIURETIC PEPTIDE: CPT | Performed by: NURSE PRACTITIONER

## 2022-03-31 PROCEDURE — 80053 COMPREHEN METABOLIC PANEL: CPT | Performed by: NURSE PRACTITIONER

## 2022-03-31 PROCEDURE — 93010 EKG 12-LEAD: ICD-10-PCS | Mod: ,,, | Performed by: INTERNAL MEDICINE

## 2022-03-31 RX ORDER — BENZONATATE 100 MG/1
100 CAPSULE ORAL 3 TIMES DAILY PRN
Qty: 20 CAPSULE | Refills: 0 | Status: SHIPPED | OUTPATIENT
Start: 2022-03-31 | End: 2022-04-10

## 2022-03-31 RX ORDER — PROMETHAZINE HYDROCHLORIDE AND CODEINE PHOSPHATE 6.25; 1 MG/5ML; MG/5ML
5 SOLUTION ORAL EVERY 4 HOURS PRN
Qty: 118 ML | Refills: 0 | Status: SHIPPED | OUTPATIENT
Start: 2022-03-31 | End: 2022-06-03

## 2022-03-31 RX ORDER — FLUTICASONE PROPIONATE 50 MCG
1 SPRAY, SUSPENSION (ML) NASAL 2 TIMES DAILY
Qty: 15 G | Refills: 0 | Status: SHIPPED | OUTPATIENT
Start: 2022-03-31

## 2022-03-31 NOTE — ED TRIAGE NOTES
Pt reports having a cough for the past two weeks that is worsening the past two days. She reports blurry vision today with tightness of the chest with the cough. She is taking promethazine for the cough and other otc meds .

## 2022-03-31 NOTE — FIRST PROVIDER EVALUATION
Emergency Department TeleTriage Encounter Note      CHIEF COMPLAINT    Chief Complaint   Patient presents with    Cough    Shortness of Breath     Pt c.o cough and congestion onset 2 week ago productive green mucus. Pt states she feel like congestion has moved to chest. Pt has SOB. Pt states she felt jittery but now states she feels like she is calming down. States she got anxious when she felt SOB AAO x 3 resp even u/l bbs c/e       VITAL SIGNS   Initial Vitals [03/31/22 1344]   BP Pulse Resp Temp SpO2   (!) 183/99 79 16 98.6 °F (37 °C) 99 %      MAP       --            ALLERGIES    Review of patient's allergies indicates:  No Known Allergies    PROVIDER TRIAGE NOTE  This is a teletriage evaluation of a 41 y.o. female presenting to the ED with c/o cough and congestion x appx 3 weeks. Worsening in the last 2 - 3 days. Blurry vision that began today. Chest tightness and shortness of breath. Limited physical exam via telehealth: The patient is awake, alert, answering questions appropriately and is not in respiratory distress. Initial orders will be placed and care will be transferred to an alternate provider when patient is roomed for a full evaluation. Any additional orders and the final disposition will be determined by that provider.         ORDERS  Labs Reviewed - No data to display    ED Orders (720h ago, onward)    Start Ordered     Status Ordering Provider    03/31/22 1721 03/31/22 1420  Troponin I #2  Once Timed         Ordered HOLDEN CARIAS    03/31/22 1421 03/31/22 1420  Vital signs  Every 15 min         Ordered HOLDEN CARIAS    03/31/22 1421 03/31/22 1420  Cardiac Monitoring - Adult  Continuous        Comments: Notify Physician If:    Ordered HOLDEN CARIAS    03/31/22 1421 03/31/22 1420  Pulse Oximetry Continuous  Continuous         Ordered HOLDEN CARIAS    03/31/22 1421 03/31/22 1420  Diet NPO  Diet effective now         Ordered HOLDEN CARIAS    03/31/22 1421 03/31/22 1420  Saline lock IV   Once         Ordered HOLDEN CARIAS.    03/31/22 1421 03/31/22 1420  EKG 12-lead  Once        Comments: Do not perform if previously done during this visit/ triage    Ordered HOLDEN CARIAS    03/31/22 1421 03/31/22 1420  CBC auto differential  STAT         Ordered HOLDEN CARIAS.    03/31/22 1421 03/31/22 1420  Comprehensive metabolic panel  STAT         Ordered HOLDEN CARIAS    03/31/22 1421 03/31/22 1420  Troponin I #1  STAT         Ordered HOLDEN CARIAS.    03/31/22 1421 03/31/22 1420  B-Type natriuretic peptide (BNP)  STAT         Ordered HOLDEN CARIAS    03/31/22 1421 03/31/22 1420  X-Ray Chest PA And Lateral  1 time imaging         Ordered HOLDEN CARIAS            Virtual Visit Note: The provider triage portion of this emergency department evaluation and documentation was performed via [employer], a HIPAA-compliant telemedicine application, in concert with a tele-presenter in the room. A face to face patient evaluation with one of my colleagues will occur once the patient is placed in an emergency department room.      DISCLAIMER: This note was prepared with [employer] voice recognition transcription software. Garbled syntax, mangled pronouns, and other bizarre constructions may be attributed to that software system.

## 2022-04-01 NOTE — ED PROVIDER NOTES
Source of History:  Patient    Chief complaint:  Cough and Shortness of Breath (Pt c.o cough and congestion onset 2 week ago productive green mucus. Pt states she feel like congestion has moved to chest. Pt has SOB. Pt states she felt jittery but now states she feels like she is calming down. States she got anxious when she felt SOB AAO x 3 resp even u/l bbs c/e)      HPI:  Mona Rosas is a 41 y.o. female presenting to the emergency department with cough, congestion and chest pain.  Patient reports productive cough for the past 2 weeks.  Cough is sometimes worse at night.  She states this began is nasal congestion and she is experiencing postnasal drip as well.  She reports chest tightness that occurs randomly as well.  She is reporting feeling lightheaded and experiencing blurry vision today.  She denies headache.  She reports taking multiple over-the-counter cold medicines and decongestants.  She denies fever.  She denies nausea, vomiting or diaphoresis.  She states she felt jittery and anxious prior to arrival but is now feeling more calm.  This is the extent to the patients complaints today here in the emergency department.    ROS: As per HPI and below:  General: No fever.  No chills.  Eyes: + blurry vision   ENT: + nasal congestion/postnasal drip  Head: No headache.    Chest/respiratory: + cough.  No dyspnea on exertion.  Cardiovascular: + chest tightness  Abdomen: No abdominal pain.  No nausea or vomiting.  Genito-Urinary: No abnormal urination.  Neurologic: No focal weakness.  No numbness. + lightheadedness  MSK: no back pain.  Integument: No rashes or lesions.  Immune:  Not immunocompromised    Review of patient's allergies indicates:  No Known Allergies    PMH:  As per HPI and below:  Past Medical History:   Diagnosis Date    Class 3 severe obesity due to excess calories without serious comorbidity with body mass index (BMI) of 50.0 to 59.9 in adult      Past Surgical History:   Procedure Laterality Date  "   CHOLECYSTECTOMY  2012    OVARIAN CYST REMOVAL         Social History     Tobacco Use    Smoking status: Never Smoker    Smokeless tobacco: Never Used   Substance Use Topics    Alcohol use: Not Currently    Drug use: Never       Physical Exam:    BP (!) 186/84 (BP Location: Left arm, Patient Position: Lying)   Pulse 72   Temp 99.2 °F (37.3 °C)   Resp 18   Ht 5' 9" (1.753 m)   Wt (!) 158.8 kg (350 lb)   LMP  (LMP Unknown)   SpO2 99%   BMI 51.69 kg/m²   Nursing note and vital signs reviewed.  Appearance: No acute distress.  Nontoxic appearing. Obese   Eyes: No conjunctival injection.  PERRLA.  Normal EOM.  ENT:  Inflamed nasal turbinates.  Cobblestoning to posterior oropharynx.  No tonsillar edema.  Uvula midline.  Chest/ Respiratory: Clear to auscultation bilaterally.  Good air movement.  No wheezes.  No rhonchi. No rales. No accessory muscle use.  Cardiovascular: Regular rate and rhythm.  No murmurs. No gallops. No rubs.  Musculoskeletal: Good range of motion all joints.  No deformities.  Neck supple.  No meningismus.  Skin: No rashes seen.  Good turgor.  No abrasions.  No ecchymoses.  Neurologic: Motor intact.  Sensation intact.  Cranial nerves intact.  Negative pronator drift.  No facial droop.  Mental Status:  Alert and oriented x 3.  Appropriate, conversant    EK:32 normal sinus rhythm at a rate of 67 beats per minute.  No STEMI.  No ischemic changes.  No ectopy.    Labs that have been ordered have been independently reviewed and interpreted by myself.    I decided to obtain the patient's medical records.    MDM:    41 y.o. female who is presenting to the emergency department with nasal congestion, postnasal drip, productive cough x2 weeks.  Today she has had blurry vision, felt lightheaded has had intermittent chest tightness.  Chest pain is atypical in nature.  EKG without ischemic changes.  Vital signs reveal elevated blood pressure without history of hypertension.  May be " contributing to patient's symptoms.  Possibly elevated blood pressure from taking decongestants/OTC cold medicines.  Will plan for blood work, cardiac workup, chest x-ray       Blood work is grossly unremarkable.  Chest x-ray reveals no acute cardiopulmonary process, no consolidation.  Troponin x2 negative.  Suspect viral etiology.  Will send home with antitussives, Flonase.  Patient advised to monitor blood pressure, discontinue over-the-counter cold medicines and establish care with primary care.          Diagnostic Impression:    1. Atypical chest pain    2. Chest pain    3. Blood pressure elevated without history of HTN    4. Viral URI with cough         ED Disposition Condition    Discharge Stable             Jono Hunt PA-C  03/31/22 2056

## 2022-05-10 ENCOUNTER — HOSPITAL ENCOUNTER (OUTPATIENT)
Dept: RADIOLOGY | Facility: OTHER | Age: 42
Discharge: HOME | End: 2022-05-10
Attending: OBSTETRICS & GYNECOLOGY
Payer: MEDICAID

## 2022-05-10 DIAGNOSIS — Z12.31 BREAST CANCER SCREENING BY MAMMOGRAM: ICD-10-CM

## 2022-05-10 PROCEDURE — 77067 SCR MAMMO BI INCL CAD: CPT | Mod: 26,,, | Performed by: RADIOLOGY

## 2022-05-10 PROCEDURE — 77063 BREAST TOMOSYNTHESIS BI: CPT | Mod: 26,,, | Performed by: RADIOLOGY

## 2022-05-10 PROCEDURE — 77067 MAMMO DIGITAL SCREENING BILAT WITH TOMO: ICD-10-PCS | Mod: 26,,, | Performed by: RADIOLOGY

## 2022-05-10 PROCEDURE — 77063 MAMMO DIGITAL SCREENING BILAT WITH TOMO: ICD-10-PCS | Mod: 26,,, | Performed by: RADIOLOGY

## 2022-05-10 PROCEDURE — 77063 BREAST TOMOSYNTHESIS BI: CPT | Mod: TC

## 2022-05-25 ENCOUNTER — LAB VISIT (OUTPATIENT)
Dept: LAB | Facility: OTHER | Age: 42
End: 2022-05-25
Attending: OBSTETRICS & GYNECOLOGY
Payer: MEDICAID

## 2022-05-25 DIAGNOSIS — N93.9 ABNORMAL UTERINE BLEEDING (AUB): ICD-10-CM

## 2022-05-25 LAB
BASOPHILS # BLD AUTO: 0.01 K/UL (ref 0–0.2)
BASOPHILS NFR BLD: 0.2 % (ref 0–1.9)
DIFFERENTIAL METHOD: ABNORMAL
EOSINOPHIL # BLD AUTO: 0.1 K/UL (ref 0–0.5)
EOSINOPHIL NFR BLD: 1.2 % (ref 0–8)
ERYTHROCYTE [DISTWIDTH] IN BLOOD BY AUTOMATED COUNT: 15.6 % (ref 11.5–14.5)
FERRITIN SERPL-MCNC: 22 NG/ML (ref 20–300)
HCT VFR BLD AUTO: 35.9 % (ref 37–48.5)
HGB BLD-MCNC: 10.6 G/DL (ref 12–16)
IMM GRANULOCYTES # BLD AUTO: 0.01 K/UL (ref 0–0.04)
IMM GRANULOCYTES NFR BLD AUTO: 0.2 % (ref 0–0.5)
LYMPHOCYTES # BLD AUTO: 1.1 K/UL (ref 1–4.8)
LYMPHOCYTES NFR BLD: 25.9 % (ref 18–48)
MCH RBC QN AUTO: 24.1 PG (ref 27–31)
MCHC RBC AUTO-ENTMCNC: 29.5 G/DL (ref 32–36)
MCV RBC AUTO: 82 FL (ref 82–98)
MONOCYTES # BLD AUTO: 0.6 K/UL (ref 0.3–1)
MONOCYTES NFR BLD: 15.2 % (ref 4–15)
NEUTROPHILS # BLD AUTO: 2.4 K/UL (ref 1.8–7.7)
NEUTROPHILS NFR BLD: 57.3 % (ref 38–73)
NRBC BLD-RTO: 0 /100 WBC
PLATELET # BLD AUTO: 301 K/UL (ref 150–450)
PMV BLD AUTO: 10.8 FL (ref 9.2–12.9)
RBC # BLD AUTO: 4.39 M/UL (ref 4–5.4)
WBC # BLD AUTO: 4.21 K/UL (ref 3.9–12.7)

## 2022-05-25 PROCEDURE — 36415 COLL VENOUS BLD VENIPUNCTURE: CPT | Performed by: OBSTETRICS & GYNECOLOGY

## 2022-05-25 PROCEDURE — 82728 ASSAY OF FERRITIN: CPT | Performed by: OBSTETRICS & GYNECOLOGY

## 2022-05-25 PROCEDURE — 85025 COMPLETE CBC W/AUTO DIFF WBC: CPT | Performed by: OBSTETRICS & GYNECOLOGY

## 2022-06-03 ENCOUNTER — TELEPHONE (OUTPATIENT)
Dept: INTERNAL MEDICINE | Facility: CLINIC | Age: 42
End: 2022-06-03
Payer: MEDICAID

## 2022-06-03 ENCOUNTER — OFFICE VISIT (OUTPATIENT)
Dept: OBSTETRICS AND GYNECOLOGY | Facility: CLINIC | Age: 42
End: 2022-06-03
Payer: MEDICAID

## 2022-06-03 VITALS
HEIGHT: 68 IN | DIASTOLIC BLOOD PRESSURE: 92 MMHG | SYSTOLIC BLOOD PRESSURE: 148 MMHG | BODY MASS INDEX: 44.41 KG/M2 | WEIGHT: 293 LBS

## 2022-06-03 DIAGNOSIS — N93.9 ABNORMAL UTERINE BLEEDING (AUB): Primary | ICD-10-CM

## 2022-06-03 DIAGNOSIS — Z30.41 ENCOUNTER FOR SURVEILLANCE OF CONTRACEPTIVE PILLS: ICD-10-CM

## 2022-06-03 DIAGNOSIS — D64.9 ANEMIA, UNSPECIFIED TYPE: ICD-10-CM

## 2022-06-03 DIAGNOSIS — M25.569 KNEE PAIN, UNSPECIFIED CHRONICITY, UNSPECIFIED LATERALITY: ICD-10-CM

## 2022-06-03 DIAGNOSIS — Z00.00 HEALTH CARE MAINTENANCE: ICD-10-CM

## 2022-06-03 PROCEDURE — 3077F SYST BP >= 140 MM HG: CPT | Mod: CPTII,,, | Performed by: OBSTETRICS & GYNECOLOGY

## 2022-06-03 PROCEDURE — 99213 OFFICE O/P EST LOW 20 MIN: CPT | Mod: PBBFAC | Performed by: OBSTETRICS & GYNECOLOGY

## 2022-06-03 PROCEDURE — 1159F MED LIST DOCD IN RCRD: CPT | Mod: CPTII,,, | Performed by: OBSTETRICS & GYNECOLOGY

## 2022-06-03 PROCEDURE — 99999 PR PBB SHADOW E&M-EST. PATIENT-LVL III: CPT | Mod: PBBFAC,,, | Performed by: OBSTETRICS & GYNECOLOGY

## 2022-06-03 PROCEDURE — 3077F PR MOST RECENT SYSTOLIC BLOOD PRESSURE >= 140 MM HG: ICD-10-PCS | Mod: CPTII,,, | Performed by: OBSTETRICS & GYNECOLOGY

## 2022-06-03 PROCEDURE — 99213 OFFICE O/P EST LOW 20 MIN: CPT | Mod: S$PBB,,, | Performed by: OBSTETRICS & GYNECOLOGY

## 2022-06-03 PROCEDURE — 3008F PR BODY MASS INDEX (BMI) DOCUMENTED: ICD-10-PCS | Mod: CPTII,,, | Performed by: OBSTETRICS & GYNECOLOGY

## 2022-06-03 PROCEDURE — 1160F RVW MEDS BY RX/DR IN RCRD: CPT | Mod: CPTII,,, | Performed by: OBSTETRICS & GYNECOLOGY

## 2022-06-03 PROCEDURE — 1160F PR REVIEW ALL MEDS BY PRESCRIBER/CLIN PHARMACIST DOCUMENTED: ICD-10-PCS | Mod: CPTII,,, | Performed by: OBSTETRICS & GYNECOLOGY

## 2022-06-03 PROCEDURE — 3080F PR MOST RECENT DIASTOLIC BLOOD PRESSURE >= 90 MM HG: ICD-10-PCS | Mod: CPTII,,, | Performed by: OBSTETRICS & GYNECOLOGY

## 2022-06-03 PROCEDURE — 99213 PR OFFICE/OUTPT VISIT, EST, LEVL III, 20-29 MIN: ICD-10-PCS | Mod: S$PBB,,, | Performed by: OBSTETRICS & GYNECOLOGY

## 2022-06-03 PROCEDURE — 3080F DIAST BP >= 90 MM HG: CPT | Mod: CPTII,,, | Performed by: OBSTETRICS & GYNECOLOGY

## 2022-06-03 PROCEDURE — 99999 PR PBB SHADOW E&M-EST. PATIENT-LVL III: ICD-10-PCS | Mod: PBBFAC,,, | Performed by: OBSTETRICS & GYNECOLOGY

## 2022-06-03 PROCEDURE — 1159F PR MEDICATION LIST DOCUMENTED IN MEDICAL RECORD: ICD-10-PCS | Mod: CPTII,,, | Performed by: OBSTETRICS & GYNECOLOGY

## 2022-06-03 PROCEDURE — 3008F BODY MASS INDEX DOCD: CPT | Mod: CPTII,,, | Performed by: OBSTETRICS & GYNECOLOGY

## 2022-06-03 RX ORDER — IBUPROFEN 200 MG
950 CAPSULE ORAL
COMMUNITY
Start: 2022-04-20 | End: 2023-02-01 | Stop reason: ALTCHOICE

## 2022-06-03 RX ORDER — ERGOCALCIFEROL 1.25 MG/1
50000 CAPSULE ORAL
COMMUNITY
Start: 2022-04-21 | End: 2022-08-16 | Stop reason: SDUPTHER

## 2022-06-03 RX ORDER — FERROUS GLUCONATE 324(38)MG
324 TABLET ORAL
COMMUNITY
Start: 2022-04-20 | End: 2022-06-03

## 2022-06-03 RX ORDER — FERROUS GLUCONATE 324(38)MG
1 TABLET ORAL EVERY MORNING
COMMUNITY
Start: 2022-04-20

## 2022-06-03 RX ORDER — FLUTICASONE PROPIONATE 50 MCG
50 SPRAY, SUSPENSION (ML) NASAL
COMMUNITY
Start: 2022-03-31 | End: 2022-06-03

## 2022-06-03 RX ORDER — ERGOCALCIFEROL 1.25 MG/1
50000 CAPSULE ORAL
COMMUNITY
Start: 2022-05-27

## 2022-06-03 NOTE — PATIENT INSTRUCTIONS
Please check out the [employer] PATIENT WEBSITE.  The site has education materials, patient stories, expert views, and a portal for you to ask questions.       [email or web address]      As always, please let me know if you have any questions.     Dr. Melida Will

## 2022-06-03 NOTE — PROGRESS NOTES
"Subjective:       Patient ID: Mona Rosas is a 41 y.o. female.    Chief Complaint:  Follow-up (BC)      History of Present Illness  40 yo  with history of AUB that was previously well controlled with Mirena IUD until device was expelled.  US and Abd x-ray confirmed no remaining device in the endometrial cavity or abdomen.      She has been taking AURA since 3/2022.  Notes a heavy bleed during the first month requiring use of Lysteda.  Since has only noted light spotting with withdrawal bleed.  Denies lightheadedness or dizziness.      Denies side effects with AURA.  Has been working with Bariatrics at [facility] to plan for surgery.       Patient Active Problem List   Diagnosis    Cyst of ovary       Past Medical History:   Diagnosis Date    Class 3 severe obesity due to excess calories without serious comorbidity with body mass index (BMI) of 50.0 to 59.9 in adult        Past Surgical History:   Procedure Laterality Date    CHOLECYSTECTOMY      OVARIAN CYST REMOVAL         OB History    Para Term  AB Living   5 4 3 1 1 4   SAB IAB Ectopic Multiple Live Births   1       4      # Outcome Date GA Lbr Khanh/2nd Weight Sex Delivery Anes PTL Lv   5  19 36w0d   F Vag-Spont   QAMAR   4 Term 09    F Vag-Spont   QAMAR   3 Term 02    M Vag-Spont   QAMAR   2 Term 98    M Vag-Spont   QAMAR   1 SAB               Obstetric Comments   Menarche at 11   No abnormal pap   No STDs    x 4, SAB x 1, BB 8#5       No LMP recorded (lmp unknown). (Menstrual status: Birth Control).   Date of Last Pap: 2021       Objective:   BP (!) 148/92   Ht 5' 8" (1.727 m)   Wt (!) 150.8 kg (332 lb 7.3 oz)   LMP  (LMP Unknown)   BMI 50.55 kg/m²   Body mass index is 50.55 kg/m².    APPEARANCE: Well nourished, well developed, in no acute distress.  PSYCH: Appropriate mood and affect.  SKIN: No acne or hirsutism  NECK: Neck symmetric without masses or thyromegaly  NODES: No inguinal, axillary, or " supraclavicular lymph node enlargement  ABDOMEN: Soft.  No tenderness or masses.    CARDIOVASCULAR: No edema of peripheral extremities  BREASTS: Symmetrical, no skin changes or visible lesions.  No palpable masses or nipple discharge bilaterally.  PELVIC: Normal external genitalia without lesions.  Normal hair distribution.  Adequate perineal body, normal urethral meatus.  Vagina moist and well rugated without lesions or discharge.  Cervix pink, without lesions, discharge or tenderness.  No significant cystocele or rectocele.  Bimanual exam shows uterus to be normal size, regular, mobile and nontender.  Adnexa without masses or tenderness.         Results for orders placed or performed in visit on 05/25/22   Ferritin   Result Value Ref Range    Ferritin 22 20.0 - 300.0 ng/mL   CBC Auto Differential   Result Value Ref Range    WBC 4.21 3.90 - 12.70 K/uL    RBC 4.39 4.00 - 5.40 M/uL    Hemoglobin 10.6 (L) 12.0 - 16.0 g/dL    Hematocrit 35.9 (L) 37.0 - 48.5 %    MCV 82 82 - 98 fL    MCH 24.1 (L) 27.0 - 31.0 pg    MCHC 29.5 (L) 32.0 - 36.0 g/dL    RDW 15.6 (H) 11.5 - 14.5 %    Platelets 301 150 - 450 K/uL    MPV 10.8 9.2 - 12.9 fL    Immature Granulocytes 0.2 0.0 - 0.5 %    Gran # (ANC) 2.4 1.8 - 7.7 K/uL    Immature Grans (Abs) 0.01 0.00 - 0.04 K/uL    Lymph # 1.1 1.0 - 4.8 K/uL    Mono # 0.6 0.3 - 1.0 K/uL    Eos # 0.1 0.0 - 0.5 K/uL    Baso # 0.01 0.00 - 0.20 K/uL    nRBC 0 0 /100 WBC    Gran % 57.3 38.0 - 73.0 %    Lymph % 25.9 18.0 - 48.0 %    Mono % 15.2 (H) 4.0 - 15.0 %    Eosinophil % 1.2 0.0 - 8.0 %    Basophil % 0.2 0.0 - 1.9 %    Differential Method Automated      Results for orders placed during the hospital encounter of 03/15/22    US Pelvis Comp with Transvag NON-OB (xpd    Narrative  EXAMINATION:  US PELVIS COMP WITH TRANSVAG NON-OB (XPD)    CLINICAL HISTORY:  Excessive and frequent menstruation with irregular cycle    TECHNIQUE:  Transabdominal sonography of the pelvis was performed, followed by  transvaginal sonography to better evaluate the uterus and ovaries.    COMPARISON:  Pelvic ultrasound 12/01/2020.    FINDINGS:  Uterus:    Size: 10.7 x 6 x 6.6 cm    Masses: None    Endometrium: Normal in this pre menopausal patient, measuring 3 mm.    There are few nabothian cysts.  There is a thin linear echogenic structure in the lower uterus/cervix seen on trans abdominal images.  This likely represents the endocervical canal.  No intrauterine device is visualized on transabdominal or transvaginal images.    Right ovary:    Size: 3.8 x 3.1 x 4.6 cm    Appearance: Normal    Vascular flow: Normal.    Left ovary:    Surgically removed.    Free Fluid:    None.    Impression  Intrauterine device is not visualized.    Prior left oophorectomy    This report was flagged in Epic as abnormal.    Electronically signed by resident: Tk Hall  Date:    03/15/2022  Time:    11:49    Electronically signed by: Berlin Man MD  Date:    03/15/2022  Time:    13:01    X-Ray Abdomen AP and Lateral  Order: [id number]   Status: Final result     Visible to patient: Yes (not seen)     Next appt: None     Dx: Intrauterine contraceptive device thr...     0 Result Notes     1 Patient Communication    Details    Reading Physician Reading Date Result Priority   Pablo Ramesh MD  [phone]  [phone] 3/24/2022 Routine     Narrative & Impression  EXAMINATION:  XR ABDOMEN AP AND LATERAL     CLINICAL HISTORY:  Displacement of intrauterine contraceptive device, subsequent encounter     TECHNIQUE:  AP view of the abdomen and pelvis     COMPARISON:  None     FINDINGS:  No IUD identified overlying the abdomen or pelvis.  The liver shadow is enlarged.  No evidence of obstruction.     Impression:     IUD is not identified.        Electronically signed by: Pablo Ramesh MD  Date:                                            03/24/2022  Time:                                           09:31             Exam Ended: 03/24/22 09:14 Last  Resulted: 03/24/22 09:31               Assessment/ Plan:     1. Abnormal uterine bleeding (AUB)     2. Encounter for surveillance of contraceptive pills     3. Anemia, unspecified type     4. Knee pain, unspecified chronicity, unspecified laterality  Ambulatory referral/consult to Internal Medicine   5. Health care maintenance  Ambulatory referral/consult to Internal Medicine     Continue OCPs.   Notify clinic of heavy or irregular bleeding.       Follow up for annual well woman exam or as needed.            Melida Will MD  [facility]  06/03/2022

## 2022-08-16 ENCOUNTER — OFFICE VISIT (OUTPATIENT)
Dept: INTERNAL MEDICINE | Facility: CLINIC | Age: 42
End: 2022-08-16
Payer: MEDICAID

## 2022-08-16 VITALS
HEART RATE: 66 BPM | SYSTOLIC BLOOD PRESSURE: 139 MMHG | BODY MASS INDEX: 50.65 KG/M2 | WEIGHT: 293 LBS | DIASTOLIC BLOOD PRESSURE: 70 MMHG

## 2022-08-16 DIAGNOSIS — R63.4 WEIGHT REDUCTION: Primary | ICD-10-CM

## 2022-08-16 DIAGNOSIS — Z00.00 HEALTH CARE MAINTENANCE: ICD-10-CM

## 2022-08-16 DIAGNOSIS — E55.9 VITAMIN D DEFICIENCY: ICD-10-CM

## 2022-08-16 DIAGNOSIS — R73.03 PREDIABETES: ICD-10-CM

## 2022-08-16 DIAGNOSIS — G89.29 CHRONIC PAIN OF LEFT KNEE: ICD-10-CM

## 2022-08-16 DIAGNOSIS — M25.562 CHRONIC PAIN OF LEFT KNEE: ICD-10-CM

## 2022-08-16 PROBLEM — N92.0 MENORRHAGIA: Status: ACTIVE | Noted: 2022-08-16

## 2022-08-16 PROCEDURE — 3008F BODY MASS INDEX DOCD: CPT | Mod: CPTII,,, | Performed by: STUDENT IN AN ORGANIZED HEALTH CARE EDUCATION/TRAINING PROGRAM

## 2022-08-16 PROCEDURE — 3075F PR MOST RECENT SYSTOLIC BLOOD PRESS GE 130-139MM HG: ICD-10-PCS | Mod: CPTII,,, | Performed by: STUDENT IN AN ORGANIZED HEALTH CARE EDUCATION/TRAINING PROGRAM

## 2022-08-16 PROCEDURE — 1160F PR REVIEW ALL MEDS BY PRESCRIBER/CLIN PHARMACIST DOCUMENTED: ICD-10-PCS | Mod: CPTII,,, | Performed by: STUDENT IN AN ORGANIZED HEALTH CARE EDUCATION/TRAINING PROGRAM

## 2022-08-16 PROCEDURE — 3078F DIAST BP <80 MM HG: CPT | Mod: CPTII,,, | Performed by: STUDENT IN AN ORGANIZED HEALTH CARE EDUCATION/TRAINING PROGRAM

## 2022-08-16 PROCEDURE — 3078F PR MOST RECENT DIASTOLIC BLOOD PRESSURE < 80 MM HG: ICD-10-PCS | Mod: CPTII,,, | Performed by: STUDENT IN AN ORGANIZED HEALTH CARE EDUCATION/TRAINING PROGRAM

## 2022-08-16 PROCEDURE — 1159F MED LIST DOCD IN RCRD: CPT | Mod: CPTII,,, | Performed by: STUDENT IN AN ORGANIZED HEALTH CARE EDUCATION/TRAINING PROGRAM

## 2022-08-16 PROCEDURE — 1159F PR MEDICATION LIST DOCUMENTED IN MEDICAL RECORD: ICD-10-PCS | Mod: CPTII,,, | Performed by: STUDENT IN AN ORGANIZED HEALTH CARE EDUCATION/TRAINING PROGRAM

## 2022-08-16 PROCEDURE — 99999 PR PBB SHADOW E&M-EST. PATIENT-LVL III: CPT | Mod: PBBFAC,,, | Performed by: STUDENT IN AN ORGANIZED HEALTH CARE EDUCATION/TRAINING PROGRAM

## 2022-08-16 PROCEDURE — 99213 OFFICE O/P EST LOW 20 MIN: CPT | Mod: PBBFAC | Performed by: STUDENT IN AN ORGANIZED HEALTH CARE EDUCATION/TRAINING PROGRAM

## 2022-08-16 PROCEDURE — 3008F PR BODY MASS INDEX (BMI) DOCUMENTED: ICD-10-PCS | Mod: CPTII,,, | Performed by: STUDENT IN AN ORGANIZED HEALTH CARE EDUCATION/TRAINING PROGRAM

## 2022-08-16 PROCEDURE — 99386 PR PREVENTIVE VISIT,NEW,40-64: ICD-10-PCS | Mod: S$PBB,,, | Performed by: STUDENT IN AN ORGANIZED HEALTH CARE EDUCATION/TRAINING PROGRAM

## 2022-08-16 PROCEDURE — 99386 PREV VISIT NEW AGE 40-64: CPT | Mod: S$PBB,,, | Performed by: STUDENT IN AN ORGANIZED HEALTH CARE EDUCATION/TRAINING PROGRAM

## 2022-08-16 PROCEDURE — 3075F SYST BP GE 130 - 139MM HG: CPT | Mod: CPTII,,, | Performed by: STUDENT IN AN ORGANIZED HEALTH CARE EDUCATION/TRAINING PROGRAM

## 2022-08-16 PROCEDURE — 1160F RVW MEDS BY RX/DR IN RCRD: CPT | Mod: CPTII,,, | Performed by: STUDENT IN AN ORGANIZED HEALTH CARE EDUCATION/TRAINING PROGRAM

## 2022-08-16 PROCEDURE — 99999 PR PBB SHADOW E&M-EST. PATIENT-LVL III: ICD-10-PCS | Mod: PBBFAC,,, | Performed by: STUDENT IN AN ORGANIZED HEALTH CARE EDUCATION/TRAINING PROGRAM

## 2022-08-16 NOTE — PROGRESS NOTES
Subjective:       Patient ID: Mona Rosas is a 41 y.o. female.    Chief Complaint: Health care maintenance [Z00.00]    Patient is new to me, here to establish care.    Health maintenance -   Denies family history of colorectal cancer.   Mammogram BI-RADS 2 in 2022.  Denies history of abnormal mammogram.  Family history of breast cancers.  Denies family history of ovarian cancers.  Last pap performed .   Following with Dr. Will for gynecology.  Denies history of prior abnormal pap smears.  Denies family history of osteoporosis.  Significant family history of cardiac disease.  UTD on COVID19 primary, Tdap (2019?) vaccinations.  Due for COVID19 booster vaccinations.  Never smoker.  Drinks alcohol 2-3 times monthly, 1-2 drinks per sitting.  Denies drug use.  Completed HIV and Hep C screening.  UTD on lipid screening.  Lab Results       Component                Value               Date                       LDLCALC                  59                  2022                  Having more regular menstrual cycles now on AURA's.  Menstruation lasts for 10-14 days.  Denies menorrhagia or requiring more than 5 pads or tampons in a single day.  Began menarche at age 11.   5 total pregnancies. 3 born full term, vaginal delivery.  1 induced , vaginal delivery.   One miscarriages.  Preclampsia.   Denies gestational HTN.  Currently sexually active with .   Uses AURA's for contraception.     Vitamin D deficiency -  Currently taking vitamin D supplementation sic    Plans for weight reduction surgery in the next few months  Gastric sleeve with Dr. Escobedo  Seeing nutritionist [id number]   Having sleep study before surgery  Once completes sleep apnea testing and nutrition eval will be able to schedule date    Prediabetes -   Lab Results       Component                Value               Date                       HGBA1C                   5.7 (H)             2022          Left knee pain, evaluated at  [facility]  Just started physical therapy   Currently limited exercise due to knee pain  Cleared by PT to walk 10 min in pool for exercise     Review of Systems   Constitutional: Negative for appetite change, chills, fatigue, fever and unexpected weight change.   Respiratory: Negative for cough and shortness of breath.    Cardiovascular: Negative for chest pain, palpitations and leg swelling.   Gastrointestinal: Negative for abdominal pain, constipation, diarrhea, nausea and vomiting.   Musculoskeletal: Positive for arthralgias, gait problem and myalgias.   Skin: Negative for rash.   Neurological: Negative for dizziness, syncope, weakness and headaches.         Current Outpatient Medications   Medication Instructions    calcium citrate (CALCITRATE) 950 mg, Oral    ergocalciferol (ERGOCALCIFEROL) 50,000 Units, Oral, Every 7 days    ferrous gluconate (FERGON) 324 MG tablet 1 tablet, Oral, Every morning    fluticasone propionate (FLONASE) 50 mcg, Each Nostril, 2 times daily    norethindrone-ethinyl estradiol (MICROGESTIN 1/20) 1-20 mg-mcg per tablet 1 tablet, Oral, Daily    pediatric multivitamin chewable tablet 1 tablet, Oral, Daily     Objective:      Vitals:    08/16/22 0925   BP: 139/70   Pulse: 66   Weight: (!) 151.1 kg (333 lb 1.8 oz)   PainSc: 0-No pain     Body mass index is 50.65 kg/m².    Physical Exam  Vitals reviewed.   Constitutional:       General: She is not in acute distress.     Appearance: Normal appearance. She is obese. She is not ill-appearing or diaphoretic.   HENT:      Head: Normocephalic and atraumatic.      Right Ear: Tympanic membrane, ear canal and external ear normal. There is no impacted cerumen.      Left Ear: Tympanic membrane, ear canal and external ear normal. There is no impacted cerumen.      Nose: Nose normal. No rhinorrhea.      Mouth/Throat:      Mouth: Mucous membranes are moist.      Pharynx: Oropharynx is clear. No oropharyngeal exudate or posterior oropharyngeal erythema.    Eyes:      General: No scleral icterus.        Right eye: No discharge.         Left eye: No discharge.      Conjunctiva/sclera: Conjunctivae normal.   Neck:      Thyroid: No thyromegaly or thyroid tenderness.      Trachea: Trachea normal.   Cardiovascular:      Rate and Rhythm: Normal rate and regular rhythm.      Heart sounds: Normal heart sounds. No murmur heard.    No friction rub. No gallop.   Pulmonary:      Effort: Pulmonary effort is normal. No respiratory distress.      Breath sounds: Normal breath sounds. No stridor. No wheezing, rhonchi or rales.   Chest:   Breasts:      Right: No supraclavicular adenopathy.      Left: No supraclavicular adenopathy.       Abdominal:      General: Bowel sounds are normal. There is no distension.      Palpations: Abdomen is soft.      Tenderness: There is no abdominal tenderness. There is no guarding or rebound.   Musculoskeletal:         General: No swelling or deformity.      Cervical back: Neck supple.   Lymphadenopathy:      Head:      Right side of head: No submandibular or posterior auricular adenopathy.      Left side of head: No submandibular or posterior auricular adenopathy.      Cervical: No cervical adenopathy.      Right cervical: No superficial, deep or posterior cervical adenopathy.     Left cervical: No superficial, deep or posterior cervical adenopathy.      Upper Body:      Right upper body: No supraclavicular adenopathy.      Left upper body: No supraclavicular adenopathy.   Skin:     General: Skin is warm and dry.   Neurological:      General: No focal deficit present.      Mental Status: She is alert. Mental status is at baseline.      Gait: Gait normal.   Psychiatric:         Mood and Affect: Mood normal.         Behavior: Behavior normal.         Assessment:       1. Health care maintenance    2. Weight reduction    3. Prediabetes    4. Chronic pain of left knee    5. Vitamin D deficiency        Plan:       Weight reduction  Will RTC for  pre-surgical clearance once date set  Will upload pre-surgical clearance form to obtain labs prior to appt  Continue medication, evaluation, and management per bariatric surgery team.    Prediabetes  Continue lifestyle and dietary modifications    Chronic pain of left knee  Continue PT and PRN diclofenac cream     Vitamin D deficiency  Needs repeat vitamin D and PTH levels with next labs   Continue supplementation     Health care maintenance  RTC in 1 year for annual appointment, sooner PRN.        Nida Astorga MD  8/16/2022

## 2022-12-27 ENCOUNTER — TELEPHONE (OUTPATIENT)
Dept: OBSTETRICS AND GYNECOLOGY | Facility: CLINIC | Age: 42
End: 2022-12-27
Payer: MEDICAID

## 2023-01-20 ENCOUNTER — TELEPHONE (OUTPATIENT)
Dept: INTERNAL MEDICINE | Facility: CLINIC | Age: 43
End: 2023-01-20
Payer: MEDICAID

## 2023-01-20 NOTE — TELEPHONE ENCOUNTER
----- Message from Amna Quan sent at 1/20/2023  1:46 PM CST -----      Name of Who is Calling: TITA CARRILLO [[id number]]      What is the request in detail: Pt called to speak with the office.Please contact to further discuss and advise.          Can the clinic reply by [facility]: N      What Number to Call Back if not in [facility]: [phone]

## 2023-01-20 NOTE — TELEPHONE ENCOUNTER
I call Ms. Rosas. She's having surgery and she's requesting her surgery form to be signed by MD. I offer her an appt on 2/1 at 11:00 AM.

## 2023-01-20 NOTE — TELEPHONE ENCOUNTER
----- Message from Susy Balbuena sent at 1/20/2023  1:39 PM CST -----  Type:  Patient Returning Call    Who Called:Pt   Who Left Message for Patient:N/A  Does the patient know what this is regarding?documents need review by doctor pt is having a surgery 03/01/2023  Would the patient rather a call back or a response via [facility]? Yes   Best Call Back Number:[phone]  .  Additional Information:

## 2023-02-01 ENCOUNTER — OFFICE VISIT (OUTPATIENT)
Dept: INTERNAL MEDICINE | Facility: CLINIC | Age: 43
End: 2023-02-01
Payer: MEDICAID

## 2023-02-01 ENCOUNTER — LAB VISIT (OUTPATIENT)
Dept: LAB | Facility: OTHER | Age: 43
End: 2023-02-01
Attending: STUDENT IN AN ORGANIZED HEALTH CARE EDUCATION/TRAINING PROGRAM
Payer: MEDICAID

## 2023-02-01 VITALS
WEIGHT: 293 LBS | OXYGEN SATURATION: 98 % | DIASTOLIC BLOOD PRESSURE: 80 MMHG | HEART RATE: 78 BPM | BODY MASS INDEX: 50.75 KG/M2 | SYSTOLIC BLOOD PRESSURE: 130 MMHG

## 2023-02-01 DIAGNOSIS — D64.9 ANEMIA, UNSPECIFIED TYPE: ICD-10-CM

## 2023-02-01 DIAGNOSIS — E55.9 VITAMIN D DEFICIENCY: ICD-10-CM

## 2023-02-01 DIAGNOSIS — E11.9 TYPE 2 DIABETES MELLITUS WITHOUT COMPLICATION, WITHOUT LONG-TERM CURRENT USE OF INSULIN: ICD-10-CM

## 2023-02-01 DIAGNOSIS — Z01.818 PREOPERATIVE CLEARANCE: Primary | ICD-10-CM

## 2023-02-01 DIAGNOSIS — E53.8 VITAMIN B12 DEFICIENCY: ICD-10-CM

## 2023-02-01 DIAGNOSIS — Z00.00 HEALTH MAINTENANCE EXAMINATION: ICD-10-CM

## 2023-02-01 DIAGNOSIS — M25.562 CHRONIC PAIN OF LEFT KNEE: ICD-10-CM

## 2023-02-01 DIAGNOSIS — Z13.6 SCREENING FOR CARDIOVASCULAR CONDITION: ICD-10-CM

## 2023-02-01 DIAGNOSIS — G89.29 CHRONIC PAIN OF LEFT KNEE: ICD-10-CM

## 2023-02-01 LAB
25(OH)D3+25(OH)D2 SERPL-MCNC: 28 NG/ML (ref 30–96)
ALBUMIN SERPL BCP-MCNC: 3.7 G/DL (ref 3.5–5.2)
ALP SERPL-CCNC: 66 U/L (ref 55–135)
ALT SERPL W/O P-5'-P-CCNC: 18 U/L (ref 10–44)
ANION GAP SERPL CALC-SCNC: 6 MMOL/L (ref 8–16)
AST SERPL-CCNC: 23 U/L (ref 10–40)
BASOPHILS # BLD AUTO: 0.03 K/UL (ref 0–0.2)
BASOPHILS NFR BLD: 0.3 % (ref 0–1.9)
BILIRUB SERPL-MCNC: 0.5 MG/DL (ref 0.1–1)
BUN SERPL-MCNC: 8 MG/DL (ref 6–20)
CALCIUM SERPL-MCNC: 9.4 MG/DL (ref 8.7–10.5)
CHLORIDE SERPL-SCNC: 104 MMOL/L (ref 95–110)
CHOLEST SERPL-MCNC: 152 MG/DL (ref 120–199)
CHOLEST/HDLC SERPL: 2.9 {RATIO} (ref 2–5)
CO2 SERPL-SCNC: 26 MMOL/L (ref 23–29)
CREAT SERPL-MCNC: 0.6 MG/DL (ref 0.5–1.4)
DIFFERENTIAL METHOD: ABNORMAL
EOSINOPHIL # BLD AUTO: 0.1 K/UL (ref 0–0.5)
EOSINOPHIL NFR BLD: 0.6 % (ref 0–8)
ERYTHROCYTE [DISTWIDTH] IN BLOOD BY AUTOMATED COUNT: 15.5 % (ref 11.5–14.5)
EST. GFR  (NO RACE VARIABLE): >60 ML/MIN/1.73 M^2
ESTIMATED AVG GLUCOSE: 105 MG/DL (ref 68–131)
FERRITIN SERPL-MCNC: 25 NG/ML (ref 20–300)
FOLATE SERPL-MCNC: 14.6 NG/ML (ref 4–24)
GLUCOSE SERPL-MCNC: 82 MG/DL (ref 70–110)
HBA1C MFR BLD: 5.3 % (ref 4–5.6)
HCT VFR BLD AUTO: 38.4 % (ref 37–48.5)
HDLC SERPL-MCNC: 52 MG/DL (ref 40–75)
HDLC SERPL: 34.2 % (ref 20–50)
HGB BLD-MCNC: 11.8 G/DL (ref 12–16)
IMM GRANULOCYTES # BLD AUTO: 0.04 K/UL (ref 0–0.04)
IMM GRANULOCYTES NFR BLD AUTO: 0.5 % (ref 0–0.5)
IRON SERPL-MCNC: 38 UG/DL (ref 30–160)
LDLC SERPL CALC-MCNC: 80.4 MG/DL (ref 63–159)
LYMPHOCYTES # BLD AUTO: 2.1 K/UL (ref 1–4.8)
LYMPHOCYTES NFR BLD: 23.8 % (ref 18–48)
MCH RBC QN AUTO: 25.5 PG (ref 27–31)
MCHC RBC AUTO-ENTMCNC: 30.7 G/DL (ref 32–36)
MCV RBC AUTO: 83 FL (ref 82–98)
MONOCYTES # BLD AUTO: 0.5 K/UL (ref 0.3–1)
MONOCYTES NFR BLD: 6.1 % (ref 4–15)
NEUTROPHILS # BLD AUTO: 6 K/UL (ref 1.8–7.7)
NEUTROPHILS NFR BLD: 68.7 % (ref 38–73)
NONHDLC SERPL-MCNC: 100 MG/DL
NRBC BLD-RTO: 0 /100 WBC
PATH REV BLD -IMP: NORMAL
PATH REV BLD -IMP: NORMAL
PLATELET # BLD AUTO: 310 K/UL (ref 150–450)
PMV BLD AUTO: 10.2 FL (ref 9.2–12.9)
POTASSIUM SERPL-SCNC: 4.2 MMOL/L (ref 3.5–5.1)
PROT SERPL-MCNC: 8 G/DL (ref 6–8.4)
PTH-INTACT SERPL-MCNC: 85.3 PG/ML (ref 9–77)
RBC # BLD AUTO: 4.63 M/UL (ref 4–5.4)
RETICS/RBC NFR AUTO: 2.1 % (ref 0.5–2.5)
SATURATED IRON: 8 % (ref 20–50)
SODIUM SERPL-SCNC: 136 MMOL/L (ref 136–145)
TOTAL IRON BINDING CAPACITY: 450 UG/DL (ref 250–450)
TRANSFERRIN SERPL-MCNC: 304 MG/DL (ref 200–375)
TRIGL SERPL-MCNC: 98 MG/DL (ref 30–150)
TSH SERPL DL<=0.005 MIU/L-ACNC: 1.23 UIU/ML (ref 0.4–4)
VIT B12 SERPL-MCNC: 298 PG/ML (ref 210–950)
WBC # BLD AUTO: 8.69 K/UL (ref 3.9–12.7)

## 2023-02-01 PROCEDURE — 85025 COMPLETE CBC W/AUTO DIFF WBC: CPT | Performed by: STUDENT IN AN ORGANIZED HEALTH CARE EDUCATION/TRAINING PROGRAM

## 2023-02-01 PROCEDURE — 3008F PR BODY MASS INDEX (BMI) DOCUMENTED: ICD-10-PCS | Mod: CPTII,,, | Performed by: STUDENT IN AN ORGANIZED HEALTH CARE EDUCATION/TRAINING PROGRAM

## 2023-02-01 PROCEDURE — 1160F RVW MEDS BY RX/DR IN RCRD: CPT | Mod: CPTII,,, | Performed by: STUDENT IN AN ORGANIZED HEALTH CARE EDUCATION/TRAINING PROGRAM

## 2023-02-01 PROCEDURE — 1159F PR MEDICATION LIST DOCUMENTED IN MEDICAL RECORD: ICD-10-PCS | Mod: CPTII,,, | Performed by: STUDENT IN AN ORGANIZED HEALTH CARE EDUCATION/TRAINING PROGRAM

## 2023-02-01 PROCEDURE — 83036 HEMOGLOBIN GLYCOSYLATED A1C: CPT | Performed by: STUDENT IN AN ORGANIZED HEALTH CARE EDUCATION/TRAINING PROGRAM

## 2023-02-01 PROCEDURE — 84443 ASSAY THYROID STIM HORMONE: CPT | Performed by: STUDENT IN AN ORGANIZED HEALTH CARE EDUCATION/TRAINING PROGRAM

## 2023-02-01 PROCEDURE — 36415 COLL VENOUS BLD VENIPUNCTURE: CPT | Performed by: STUDENT IN AN ORGANIZED HEALTH CARE EDUCATION/TRAINING PROGRAM

## 2023-02-01 PROCEDURE — 3079F PR MOST RECENT DIASTOLIC BLOOD PRESSURE 80-89 MM HG: ICD-10-PCS | Mod: CPTII,,, | Performed by: STUDENT IN AN ORGANIZED HEALTH CARE EDUCATION/TRAINING PROGRAM

## 2023-02-01 PROCEDURE — 99214 OFFICE O/P EST MOD 30 MIN: CPT | Mod: S$PBB,,, | Performed by: STUDENT IN AN ORGANIZED HEALTH CARE EDUCATION/TRAINING PROGRAM

## 2023-02-01 PROCEDURE — 99999 PR PBB SHADOW E&M-EST. PATIENT-LVL III: ICD-10-PCS | Mod: PBBFAC,,, | Performed by: STUDENT IN AN ORGANIZED HEALTH CARE EDUCATION/TRAINING PROGRAM

## 2023-02-01 PROCEDURE — 3075F PR MOST RECENT SYSTOLIC BLOOD PRESS GE 130-139MM HG: ICD-10-PCS | Mod: CPTII,,, | Performed by: STUDENT IN AN ORGANIZED HEALTH CARE EDUCATION/TRAINING PROGRAM

## 2023-02-01 PROCEDURE — 85045 AUTOMATED RETICULOCYTE COUNT: CPT | Performed by: STUDENT IN AN ORGANIZED HEALTH CARE EDUCATION/TRAINING PROGRAM

## 2023-02-01 PROCEDURE — 3044F HG A1C LEVEL LT 7.0%: CPT | Mod: CPTII,,, | Performed by: STUDENT IN AN ORGANIZED HEALTH CARE EDUCATION/TRAINING PROGRAM

## 2023-02-01 PROCEDURE — 83970 ASSAY OF PARATHORMONE: CPT | Performed by: STUDENT IN AN ORGANIZED HEALTH CARE EDUCATION/TRAINING PROGRAM

## 2023-02-01 PROCEDURE — 3008F BODY MASS INDEX DOCD: CPT | Mod: CPTII,,, | Performed by: STUDENT IN AN ORGANIZED HEALTH CARE EDUCATION/TRAINING PROGRAM

## 2023-02-01 PROCEDURE — 3075F SYST BP GE 130 - 139MM HG: CPT | Mod: CPTII,,, | Performed by: STUDENT IN AN ORGANIZED HEALTH CARE EDUCATION/TRAINING PROGRAM

## 2023-02-01 PROCEDURE — 80061 LIPID PANEL: CPT | Performed by: STUDENT IN AN ORGANIZED HEALTH CARE EDUCATION/TRAINING PROGRAM

## 2023-02-01 PROCEDURE — 82746 ASSAY OF FOLIC ACID SERUM: CPT | Performed by: STUDENT IN AN ORGANIZED HEALTH CARE EDUCATION/TRAINING PROGRAM

## 2023-02-01 PROCEDURE — 85060 PATHOLOGIST REVIEW: ICD-10-PCS | Mod: ,,, | Performed by: PATHOLOGY

## 2023-02-01 PROCEDURE — 82306 VITAMIN D 25 HYDROXY: CPT | Performed by: STUDENT IN AN ORGANIZED HEALTH CARE EDUCATION/TRAINING PROGRAM

## 2023-02-01 PROCEDURE — 1159F MED LIST DOCD IN RCRD: CPT | Mod: CPTII,,, | Performed by: STUDENT IN AN ORGANIZED HEALTH CARE EDUCATION/TRAINING PROGRAM

## 2023-02-01 PROCEDURE — 99213 OFFICE O/P EST LOW 20 MIN: CPT | Mod: PBBFAC | Performed by: STUDENT IN AN ORGANIZED HEALTH CARE EDUCATION/TRAINING PROGRAM

## 2023-02-01 PROCEDURE — 82607 VITAMIN B-12: CPT | Performed by: STUDENT IN AN ORGANIZED HEALTH CARE EDUCATION/TRAINING PROGRAM

## 2023-02-01 PROCEDURE — 84466 ASSAY OF TRANSFERRIN: CPT | Performed by: STUDENT IN AN ORGANIZED HEALTH CARE EDUCATION/TRAINING PROGRAM

## 2023-02-01 PROCEDURE — 80053 COMPREHEN METABOLIC PANEL: CPT | Performed by: STUDENT IN AN ORGANIZED HEALTH CARE EDUCATION/TRAINING PROGRAM

## 2023-02-01 PROCEDURE — 1160F PR REVIEW ALL MEDS BY PRESCRIBER/CLIN PHARMACIST DOCUMENTED: ICD-10-PCS | Mod: CPTII,,, | Performed by: STUDENT IN AN ORGANIZED HEALTH CARE EDUCATION/TRAINING PROGRAM

## 2023-02-01 PROCEDURE — 99999 PR PBB SHADOW E&M-EST. PATIENT-LVL III: CPT | Mod: PBBFAC,,, | Performed by: STUDENT IN AN ORGANIZED HEALTH CARE EDUCATION/TRAINING PROGRAM

## 2023-02-01 PROCEDURE — 3044F PR MOST RECENT HEMOGLOBIN A1C LEVEL <7.0%: ICD-10-PCS | Mod: CPTII,,, | Performed by: STUDENT IN AN ORGANIZED HEALTH CARE EDUCATION/TRAINING PROGRAM

## 2023-02-01 PROCEDURE — 3079F DIAST BP 80-89 MM HG: CPT | Mod: CPTII,,, | Performed by: STUDENT IN AN ORGANIZED HEALTH CARE EDUCATION/TRAINING PROGRAM

## 2023-02-01 PROCEDURE — 82728 ASSAY OF FERRITIN: CPT | Performed by: STUDENT IN AN ORGANIZED HEALTH CARE EDUCATION/TRAINING PROGRAM

## 2023-02-01 PROCEDURE — 85060 BLOOD SMEAR INTERPRETATION: CPT | Mod: ,,, | Performed by: PATHOLOGY

## 2023-02-01 PROCEDURE — 99214 PR OFFICE/OUTPT VISIT, EST, LEVL IV, 30-39 MIN: ICD-10-PCS | Mod: S$PBB,,, | Performed by: STUDENT IN AN ORGANIZED HEALTH CARE EDUCATION/TRAINING PROGRAM

## 2023-02-01 RX ORDER — METFORMIN HYDROCHLORIDE 500 MG/1
1000 TABLET, EXTENDED RELEASE ORAL 2 TIMES DAILY WITH MEALS
Qty: 360 TABLET | Refills: 3 | Status: SHIPPED | OUTPATIENT
Start: 2023-02-01 | End: 2024-02-01

## 2023-02-01 NOTE — PROGRESS NOTES
Subjective:       Patient ID: Mona Rosas is a 42 y.o. female.    Chief Complaint: Type 2 diabetes mellitus without complication, without long-term current use of insulin [E11.9]    Patient is established with me, here today for the following:    Procedure: Sleeve gastrectomy  Surgeon: Dr. Escobedo  Date: 01MAR2023    Indications: morbid obesity    DASI scale: 58.2 points  METs: 9.89    Alcohol use: Drinks alcohol 2-3 times monthly, 1-2 drinks per sitting.  Tobacco use: Never smoker.  Chronic narcotic use: none    Anti-coagulant therapy: none  Anti-platelet therapy: none    Pregnant: Denies, taking AURA's. Pregnancy testing ordered.  ELIAS: Had sleep study 18DEC, told no sleep apnea.  Prior anesthesia: tolerated well previously    Follow up labs/imaging/EKG: Had CBC, CMP, A1c at OSH. EKG completed at OSH. Chest x-ray completed at OSH.   Clearance from another provider: none    Vitamin D deficiency -  Currently taking vitamin D supplementation since APR2022     T2DM -   New diagnosis of diabetes, previously prediabetic  Due for foot exam  Due for eye exam  Lab Results       Component                Value               Date                       HGBA1C                   8.9 (H)             01/31/2023                 HGBA1C                   5.7 (H)             04/20/2022              Anemia -   Noted on recent CBC for pre-op labs   Taking iron tablets daily             Review of Systems   Constitutional:  Negative for appetite change, chills, fatigue, fever and unexpected weight change.   Respiratory:  Negative for cough and shortness of breath.    Cardiovascular:  Negative for chest pain, palpitations and leg swelling.   Gastrointestinal:  Negative for abdominal pain, constipation, diarrhea, nausea and vomiting.   Skin:  Negative for rash.   Neurological:  Negative for dizziness, syncope, weakness and headaches.       Current Outpatient Medications   Medication Instructions    ergocalciferol (ERGOCALCIFEROL) 50,000 Units,  Oral, Every 7 days    ferrous gluconate (FERGON) 324 MG tablet 1 tablet, Oral, Every morning    fluticasone propionate (FLONASE) 50 mcg, Each Nostril, 2 times daily    metFORMIN (GLUCOPHAGE-XR) 1,000 mg, Oral, 2 times daily with meals    norethindrone-ethinyl estradiol (MICROGESTIN 1/20) 1-20 mg-mcg per tablet 1 tablet, Oral, Daily    pediatric multivitamin chewable tablet 1 tablet, Oral, Daily     Objective:      Vitals:    02/01/23 0953   BP: 130/80   Pulse: 78   SpO2: 98%   Weight: (!) 151.4 kg (333 lb 12.4 oz)   PainSc: 0-No pain     Body mass index is 50.75 kg/m².    Physical Exam  Vitals reviewed.   Constitutional:       General: She is not in acute distress.     Appearance: Normal appearance. She is not ill-appearing or diaphoretic.   HENT:      Head: Normocephalic and atraumatic.      Right Ear: Tympanic membrane, ear canal and external ear normal. There is no impacted cerumen.      Left Ear: Tympanic membrane, ear canal and external ear normal. There is no impacted cerumen.      Nose: Nose normal. No rhinorrhea.      Mouth/Throat:      Mouth: Mucous membranes are moist.      Pharynx: Oropharynx is clear. No oropharyngeal exudate or posterior oropharyngeal erythema.   Eyes:      General: No scleral icterus.        Right eye: No discharge.         Left eye: No discharge.      Conjunctiva/sclera: Conjunctivae normal.   Neck:      Thyroid: No thyromegaly or thyroid tenderness.      Trachea: Trachea normal.   Cardiovascular:      Rate and Rhythm: Normal rate and regular rhythm.      Heart sounds: Normal heart sounds. No murmur heard.    No friction rub. No gallop.   Pulmonary:      Effort: Pulmonary effort is normal. No respiratory distress.      Breath sounds: Normal breath sounds. No stridor. No wheezing, rhonchi or rales.   Abdominal:      General: There is no distension.      Palpations: Abdomen is soft.      Tenderness: There is no abdominal tenderness. There is no guarding or rebound.   Musculoskeletal:          General: No swelling or deformity.      Cervical back: Neck supple.   Lymphadenopathy:      Head:      Right side of head: No submandibular or posterior auricular adenopathy.      Left side of head: No submandibular or posterior auricular adenopathy.      Cervical: No cervical adenopathy.      Right cervical: No superficial, deep or posterior cervical adenopathy.     Left cervical: No superficial, deep or posterior cervical adenopathy.      Upper Body:      Right upper body: No supraclavicular adenopathy.      Left upper body: No supraclavicular adenopathy.   Skin:     General: Skin is warm and dry.   Neurological:      General: No focal deficit present.      Mental Status: She is alert. Mental status is at baseline.      Gait: Gait normal.   Psychiatric:         Mood and Affect: Mood normal.         Behavior: Behavior normal.       Assessment:       1. Type 2 diabetes mellitus without complication, without long-term current use of insulin    2. Anemia, unspecified type    3. Vitamin B12 deficiency    4. Chronic pain of left knee    5. Vitamin D deficiency    6. Health maintenance examination    7. Screening for cardiovascular condition        Plan:       Preoperative clearance  Clearance pending repeat labs and glucose stabilization  Chest x-ray and EKG performed at OSH    Type 2 diabetes mellitus without complication, without long-term current use of insulin  Start metformin daily  Recheck labs in 2 weeks to see if fasting glucose WNL  -     metFORMIN (GLUCOPHAGE-XR) 500 MG ER 24hr tablet; Take 2 tablets (1,000 mg total) by mouth 2 (two) times daily with meals.  -     Diabetic Eye Screening Photo; Future  -     CBC Auto Differential; Future  -     Comprehensive Metabolic Panel; Future  -     Hemoglobin A1C; Future  -     Microalbumin/Creatinine Ratio, Urine; Future    Anemia, unspecified type  Vitamin B12 deficiency  -     CBC Auto Differential; Future  -     Vitamin B12; Future  -     Folate; Future  -      Reticulocytes; Future  -     Pathologist Interpretation Differential; Future  -     Iron and TIBC; Future  -     Ferritin; Future    Chronic pain of left knee  -     Ambulatory referral/consult to Physical/Occupational Therapy; Future    Vitamin D deficiency  -     Vitamin D; Future  -     PTH, intact; Future    Health maintenance examination  -     CBC Auto Differential; Future  -     Comprehensive Metabolic Panel; Future  -     TSH; Future  -     Lipid Panel; Future  -     Hemoglobin A1C; Future  -     Vitamin D; Future  -     Vitamin B12; Future  -     Microalbumin/Creatinine Ratio, Urine; Future  -     PTH, intact; Future    Screening for cardiovascular condition  -     Lipid Panel; Future      Nida Astorga MD  2/1/2023        Labs reviewed.  Anemia improving, mild anemia, near normal range. Start vitamin B12 supplementation, continue iron supplementation perioperatively.  Start vitamin D supplementation  A1c with return to non-diabetic range.   Patient cleared for surgery with a Revised Cardiac Risk Index for Pre-Operative Risk of 3.9% 30-day risk of death, MI, or cardiac arrest.     Nida Astorga MD  02/06/2023

## 2023-02-07 ENCOUNTER — TELEPHONE (OUTPATIENT)
Dept: INTERNAL MEDICINE | Facility: CLINIC | Age: 43
End: 2023-02-07
Payer: MEDICAID

## 2023-02-28 DIAGNOSIS — Z30.011 ENCOUNTER FOR INITIAL PRESCRIPTION OF CONTRACEPTIVE PILLS: ICD-10-CM

## 2023-02-28 RX ORDER — NORETHINDRONE ACETATE AND ETHINYL ESTRADIOL .02; 1 MG/1; MG/1
1 TABLET ORAL DAILY
Qty: 84 TABLET | Refills: 2 | Status: SHIPPED | OUTPATIENT
Start: 2023-02-28 | End: 2023-11-09

## 2023-02-28 NOTE — TELEPHONE ENCOUNTER
----- Message from Myesha Pike sent at 2/28/2023  8:16 AM CST -----  Type:  RX Refill Request    Who Called:  pt  Refill or New Rx: refill   RX Name and Strength:norethindrone-ethinyl estradiol (MICROGESTIN 1/20) 1-20 mg-mcg per tablet  How is the patient currently taking it? (ex. 1XDay): Route: Take 1 tablet by mouth once daily. - Oral  Is this a 30 day or 90 day RX:90  Preferred Pharmacy with phone number: [id number] [id number] [place] [address] AT [facility]   Phone: [phone]  Fax:  [phone]    Local or Mail Order:local  Ordering Provider: Dr. Will  Would the patient rather a call back or a response via [facility]? call  Best Call Back Number:[phone]  Additional Information:

## 2023-04-04 ENCOUNTER — PATIENT MESSAGE (OUTPATIENT)
Dept: RESEARCH | Facility: HOSPITAL | Age: 43
End: 2023-04-04
Payer: MEDICAID

## 2023-12-07 ENCOUNTER — OFFICE VISIT (OUTPATIENT)
Dept: OBSTETRICS AND GYNECOLOGY | Facility: CLINIC | Age: 43
End: 2023-12-07
Payer: MEDICAID

## 2023-12-07 VITALS
HEIGHT: 68 IN | BODY MASS INDEX: 36.66 KG/M2 | SYSTOLIC BLOOD PRESSURE: 122 MMHG | WEIGHT: 241.88 LBS | DIASTOLIC BLOOD PRESSURE: 74 MMHG

## 2023-12-07 DIAGNOSIS — Z30.015 ENCOUNTER FOR INITIAL PRESCRIPTION OF VAGINAL RING HORMONAL CONTRACEPTIVE: ICD-10-CM

## 2023-12-07 DIAGNOSIS — Z01.419 ENCOUNTER FOR ANNUAL ROUTINE GYNECOLOGICAL EXAMINATION: Primary | ICD-10-CM

## 2023-12-07 DIAGNOSIS — Z11.3 SCREEN FOR STD (SEXUALLY TRANSMITTED DISEASE): ICD-10-CM

## 2023-12-07 DIAGNOSIS — Z12.4 CERVICAL CANCER SCREENING: ICD-10-CM

## 2023-12-07 DIAGNOSIS — Z12.31 BREAST CANCER SCREENING BY MAMMOGRAM: ICD-10-CM

## 2023-12-07 PROCEDURE — 3044F HG A1C LEVEL LT 7.0%: CPT | Mod: CPTII,S$GLB,, | Performed by: OBSTETRICS & GYNECOLOGY

## 2023-12-07 PROCEDURE — 87491 CHLMYD TRACH DNA AMP PROBE: CPT | Performed by: OBSTETRICS & GYNECOLOGY

## 2023-12-07 PROCEDURE — 3008F BODY MASS INDEX DOCD: CPT | Mod: CPTII,S$GLB,, | Performed by: OBSTETRICS & GYNECOLOGY

## 2023-12-07 PROCEDURE — 87624 HPV HI-RISK TYP POOLED RSLT: CPT | Performed by: OBSTETRICS & GYNECOLOGY

## 2023-12-07 PROCEDURE — 3044F PR MOST RECENT HEMOGLOBIN A1C LEVEL <7.0%: ICD-10-PCS | Mod: CPTII,S$GLB,, | Performed by: OBSTETRICS & GYNECOLOGY

## 2023-12-07 PROCEDURE — 1159F MED LIST DOCD IN RCRD: CPT | Mod: CPTII,S$GLB,, | Performed by: OBSTETRICS & GYNECOLOGY

## 2023-12-07 PROCEDURE — 3008F PR BODY MASS INDEX (BMI) DOCUMENTED: ICD-10-PCS | Mod: CPTII,S$GLB,, | Performed by: OBSTETRICS & GYNECOLOGY

## 2023-12-07 PROCEDURE — 3074F PR MOST RECENT SYSTOLIC BLOOD PRESSURE < 130 MM HG: ICD-10-PCS | Mod: CPTII,S$GLB,, | Performed by: OBSTETRICS & GYNECOLOGY

## 2023-12-07 PROCEDURE — 88142 CYTOPATH C/V THIN LAYER: CPT | Performed by: OBSTETRICS & GYNECOLOGY

## 2023-12-07 PROCEDURE — 1159F PR MEDICATION LIST DOCUMENTED IN MEDICAL RECORD: ICD-10-PCS | Mod: CPTII,S$GLB,, | Performed by: OBSTETRICS & GYNECOLOGY

## 2023-12-07 PROCEDURE — 3078F DIAST BP <80 MM HG: CPT | Mod: CPTII,S$GLB,, | Performed by: OBSTETRICS & GYNECOLOGY

## 2023-12-07 PROCEDURE — 99396 PREV VISIT EST AGE 40-64: CPT | Mod: S$GLB,,, | Performed by: OBSTETRICS & GYNECOLOGY

## 2023-12-07 PROCEDURE — 3078F PR MOST RECENT DIASTOLIC BLOOD PRESSURE < 80 MM HG: ICD-10-PCS | Mod: CPTII,S$GLB,, | Performed by: OBSTETRICS & GYNECOLOGY

## 2023-12-07 PROCEDURE — 3074F SYST BP LT 130 MM HG: CPT | Mod: CPTII,S$GLB,, | Performed by: OBSTETRICS & GYNECOLOGY

## 2023-12-07 PROCEDURE — 99396 PR PREVENTIVE VISIT,EST,40-64: ICD-10-PCS | Mod: S$GLB,,, | Performed by: OBSTETRICS & GYNECOLOGY

## 2023-12-07 PROCEDURE — 87591 N.GONORRHOEAE DNA AMP PROB: CPT | Performed by: OBSTETRICS & GYNECOLOGY

## 2023-12-07 RX ORDER — ETONOGESTREL AND ETHINYL ESTRADIOL VAGINAL RING .015; .12 MG/D; MG/D
1 RING VAGINAL
Qty: 3 EACH | Refills: 3 | Status: SHIPPED | OUTPATIENT
Start: 2023-12-07 | End: 2023-12-07 | Stop reason: ALTCHOICE

## 2023-12-07 RX ORDER — SEGESTERONE ACETATE AND ETHINYL ESTRADIOL 103; 17.4 MG/1; MG/1
RING VAGINAL
Qty: 1 EACH | Refills: 0 | Status: SHIPPED | OUTPATIENT
Start: 2023-12-07

## 2023-12-07 NOTE — PROGRESS NOTES
Chief Complaint: Well Woman Exam     HPI:      Mona Rosas is a 43 y.o.  who presents today for well woman exam.  LMP: Patient's last menstrual period was 11/10/2023 (exact date).  No issues, problems, or complaints. Specifically, patient denies abnormal vaginal bleeding, discharge, pelvic pain, urinary problems, or changes in appetite. Ms. Rosas is currently sexually active with a single male partner. She is currently using oral contraceptives (estrogen/progesterone) for contraception. She would like STD screening today.    Patient underwent gastric sleeve procedure 3/2023.  Has lost 100 pounds since.  Denies complaints of nausea or emesis.     Previous Pap:  no abnormalities ()  Previous Mammogram:    Results for orders placed during the hospital encounter of 05/10/22    Mammo Digital Screening Bilat w/ Vinny    Narrative  Result:  Mammo Digital Screening Bilat w/ Vinny    History:  Patient is 41 y.o. and is seen for a screening mammogram.      Films Compared:  Compared to: 2020 Mammo Digital Screening Bilat w/ Vinny    Findings:  This procedure was performed using tomosynthesis.  Computer-aided detection was utilized in the interpretation of this examination.    The breasts have scattered areas of fibroglandular density. There is no evidence of suspicious masses, microcalcifications or architectural distortion.    Impression  No mammographic evidence of malignancy.    BI-RADS Category 1: Negative    Recommendation:  Routine screening mammogram in 1 year is recommended.    Your estimated lifetime risk of breast cancer (to age 85) based on Tyrer-Cuzick risk assessment model is 12.36 %.  According to the American Cancer Society, patients with a lifetime breast cancer risk of 20% or higher might benefit from supplemental screening tests. ??     Most Recent Dexa: not indicated  Colonoscopy: never had    Gardasil:has never had     Patient Active Problem List   Diagnosis    Cyst of ovary    Menorrhagia     "Vitamin D deficiency    Prediabetes    Type 2 diabetes mellitus without complication, without long-term current use of insulin       Past Medical History:   Diagnosis Date    Anemia     Obesity (BMI 35.0-39.9 without comorbidity)        Past Surgical History:   Procedure Laterality Date    CHOLECYSTECTOMY      LAPAROSCOPIC SLEEVE GASTRECTOMY  2023    OVARIAN CYST REMOVAL      WISDOM TOOTH EXTRACTION         OB History          5    Para   4    Term   3       1    AB   1    Living   4         SAB   1    IAB        Ectopic        Multiple        Live Births   4           Obstetric Comments   Menarche at 11  No abnormal pap  No STDs   x 4, SAB x 1, BB 8#5               ROS:     Review of Systems    Physical Exam:      PHYSICAL EXAM:  /74   Ht 5' 8" (1.727 m)   Wt 109.7 kg (241 lb 13.5 oz)   LMP 11/10/2023 (Exact Date)   BMI 36.77 kg/m²   Body mass index is 36.77 kg/m².     APPEARANCE: Well nourished, well developed, in no acute distress.  PSYCH: Appropriate mood and affect.  SKIN: No acne or hirsutism  NECK: Neck symmetric without masses or thyromegaly  NODES: No inguinal, axillary, or supraclavicular lymph node enlargement  ABDOMEN: Soft.  No tenderness or masses.    CARDIOVASCULAR: No edema of peripheral extremities  BREASTS: Symmetrical, no skin changes or visible lesions.  No palpable masses or nipple discharge bilaterally.  PELVIC: Normal external genitalia without lesions.  Normal hair distribution.  Adequate perineal body, normal urethral meatus.  Vagina moist and well rugated without lesions or discharge.  Cervix pink, without lesions, discharge or tenderness.  No significant cystocele or rectocele.  Bimanual exam shows uterus to be normal size, regular, mobile and nontender.  Adnexa without masses or tenderness.      Assessment:     1. Encounter for annual routine gynecological examination        2. Breast cancer screening by mammogram  Mammo Digital Screening Bilat w/ " Vinny      3. Cervical cancer screening  Liquid-Based Pap Smear, Screening    HPV High Risk Genotypes, PCR      4. Encounter for initial prescription of vaginal ring hormonal contraceptive  segesterone ac-ethin estradioL (ANNOVERA) 0.15-0.013 mg/24 hour Ring    DISCONTINUED: etonogestreL-ethinyl estradioL (NUVARING) 0.12-0.015 mg/24 hr vaginal ring      5. Screen for STD (sexually transmitted disease)  C. trachomatis/N. gonorrhoeae by AMP DNA [facility]; Cervicovaginal            Plan:     Clinical breast exam performed.  Pap collected.  Mammogram ordered.  DEXA at 64.  Colonoscopy at 44.  Contraception: Reviewed [facility] and discussed AURA s/p gastric sleeve.  AURA are acceptable to use in setting of restrictive bariatric surgery. Would avoid POP.  Patient desires trial of NuvaRing.  Will continue to discuss vasectomy with her partner.   STD screening.  Follow up in about 1 year (around 12/7/2024) for annual well woman exam or as needed.    Counseling:     Patient was counseled today on current ASCCP pap guidelines, the recommendation for yearly pelvic exams, healthy diet and exercise routines, breast self awareness and annual mammograms.She is to see her PCP for other health maintenance.     Use of the [facility] Patient Portal discussed and encouraged during today's visit.         Amanda N. Thomas, MD [facility]  12/07/2023

## 2023-12-08 LAB
C TRACH DNA SPEC QL NAA+PROBE: NOT DETECTED
N GONORRHOEA DNA SPEC QL NAA+PROBE: NOT DETECTED

## 2023-12-11 ENCOUNTER — TELEPHONE (OUTPATIENT)
Dept: OBSTETRICS AND GYNECOLOGY | Facility: CLINIC | Age: 43
End: 2023-12-11
Payer: MEDICAID

## 2023-12-11 NOTE — TELEPHONE ENCOUNTER
----- Message from Desirae Bautista sent at 12/11/2023  1:39 PM CST -----  Contact: TITA CARRILLO [[id number]]  Type:  Patient Returning Call      Who Called:   TITA CARRILLO [[id number]]      Who Left Message for Patient: Poli Mckeon MA      Does the patient know what this is regarding?: Yes      Would the patient rather a call back or a response via [facility]? Call back        Best Call Back Number: [phone] (home)       Additional Information:

## 2023-12-11 NOTE — TELEPHONE ENCOUNTER
----- Message from Desirae Luu sent at 12/11/2023  8:11 AM CST -----  Type:  Needs Medical Advice    Who Called: pt  Symptoms (please be specific): the birth control you prescribed is out of stock please send the ones I am originally taking  so I am taking something for birth control   Would the patient rather a call back or a response via [facility]? call  Best Call Back Number: [phone]  Additional Information:

## 2023-12-20 LAB
FINAL PATHOLOGIC DIAGNOSIS: NORMAL
Lab: NORMAL

## 2024-01-10 ENCOUNTER — HOSPITAL ENCOUNTER (OUTPATIENT)
Dept: RADIOLOGY | Facility: HOSPITAL | Age: 44
Discharge: HOME | End: 2024-01-10
Attending: OBSTETRICS & GYNECOLOGY
Payer: MEDICAID

## 2024-01-10 DIAGNOSIS — Z12.31 BREAST CANCER SCREENING BY MAMMOGRAM: ICD-10-CM

## 2024-01-10 PROCEDURE — 77067 SCR MAMMO BI INCL CAD: CPT | Mod: TC

## 2024-01-10 PROCEDURE — 77067 SCR MAMMO BI INCL CAD: CPT | Mod: 26,,, | Performed by: RADIOLOGY

## 2024-01-10 PROCEDURE — 77063 BREAST TOMOSYNTHESIS BI: CPT | Mod: 26,,, | Performed by: RADIOLOGY

## 2024-03-14 DIAGNOSIS — Z30.015 ENCOUNTER FOR INITIAL PRESCRIPTION OF VAGINAL RING HORMONAL CONTRACEPTIVE: ICD-10-CM

## 2024-03-14 RX ORDER — ETONOGESTREL AND ETHINYL ESTRADIOL .015; .12 MG/D; MG/D
INSERT, EXTENDED RELEASE VAGINAL
OUTPATIENT
Start: 2024-03-14

## 2024-03-15 NOTE — TELEPHONE ENCOUNTER
Refill Decision Note   Mona Rosas  is requesting a refill authorization.  Brief Assessment and Rationale for Refill:  Quick Discontinue     Medication Therapy Plan: Dc'silver (12/7/23)      Comments:     Note composed:8:26 PM 03/14/2024

## 2024-12-03 ENCOUNTER — PATIENT MESSAGE (OUTPATIENT)
Dept: OBSTETRICS AND GYNECOLOGY | Facility: CLINIC | Age: 44
End: 2024-12-03
Payer: MEDICAID

## 2025-03-17 ENCOUNTER — TELEPHONE (OUTPATIENT)
Dept: OBSTETRICS AND GYNECOLOGY | Facility: CLINIC | Age: 45
End: 2025-03-17
Payer: MEDICAID

## 2025-03-17 NOTE — TELEPHONE ENCOUNTER
Called to assist patient with scheduling annual check up. Patient requesting to be seen as soon as possible due to her being without birth control at this time. Patient declined soonest available due to work schedule. Patient requests to be seen this week Wednesday or Thursday with any provider that can get her in. Her provider not available the days needed.  Appt added. Patient expressed understanding.

## 2025-03-19 ENCOUNTER — OFFICE VISIT (OUTPATIENT)
Dept: OBSTETRICS AND GYNECOLOGY | Facility: CLINIC | Age: 45
End: 2025-03-19
Payer: MEDICAID

## 2025-03-19 ENCOUNTER — LAB VISIT (OUTPATIENT)
Dept: LAB | Facility: HOSPITAL | Age: 45
End: 2025-03-19
Payer: MEDICAID

## 2025-03-19 VITALS
DIASTOLIC BLOOD PRESSURE: 94 MMHG | SYSTOLIC BLOOD PRESSURE: 158 MMHG | WEIGHT: 242.19 LBS | BODY MASS INDEX: 36.82 KG/M2

## 2025-03-19 DIAGNOSIS — R21 SKIN RASH: ICD-10-CM

## 2025-03-19 DIAGNOSIS — Z30.09 ENCOUNTER FOR GENERAL COUNSELING AND ADVICE ON CONTRACEPTIVE MANAGEMENT: ICD-10-CM

## 2025-03-19 DIAGNOSIS — Z30.49 ENCOUNTER FOR SURVEILLANCE OF NUVARING: ICD-10-CM

## 2025-03-19 DIAGNOSIS — Z11.3 ENCNTR SCREEN FOR INFECTIONS W SEXL MODE OF TRANSMISS: ICD-10-CM

## 2025-03-19 DIAGNOSIS — Z01.411 ENCNTR FOR GYN EXAM (GENERAL) (ROUTINE) W ABNORMAL FINDINGS: Primary | ICD-10-CM

## 2025-03-19 DIAGNOSIS — Z12.31 ENCOUNTER FOR SCREENING MAMMOGRAM FOR BREAST CANCER: ICD-10-CM

## 2025-03-19 LAB
B-HCG UR QL: NEGATIVE
CTP QC/QA: YES
HAV IGM SERPL QL IA: NORMAL
HBV CORE IGM SERPL QL IA: NORMAL
HBV SURFACE AG SERPL QL IA: NORMAL
HCV AB SERPL QL IA: NORMAL
HIV 1+2 AB+HIV1 P24 AG SERPL QL IA: NORMAL
TREPONEMA PALLIDUM IGG+IGM AB [PRESENCE] IN SERUM OR PLASMA BY IMMUNOASSAY: REACTIVE

## 2025-03-19 PROCEDURE — 36415 COLL VENOUS BLD VENIPUNCTURE: CPT | Mod: PN | Performed by: NURSE PRACTITIONER

## 2025-03-19 PROCEDURE — 87591 N.GONORRHOEAE DNA AMP PROB: CPT | Performed by: NURSE PRACTITIONER

## 2025-03-19 PROCEDURE — 99213 OFFICE O/P EST LOW 20 MIN: CPT | Mod: PBBFAC,PN | Performed by: NURSE PRACTITIONER

## 2025-03-19 PROCEDURE — 99999 PR PBB SHADOW E&M-EST. PATIENT-LVL III: CPT | Mod: PBBFAC,,, | Performed by: NURSE PRACTITIONER

## 2025-03-19 PROCEDURE — 99999PBSHW POCT URINE PREGNANCY: Mod: PBBFAC,,,

## 2025-03-19 PROCEDURE — 81025 URINE PREGNANCY TEST: CPT | Mod: PBBFAC,PN | Performed by: NURSE PRACTITIONER

## 2025-03-19 PROCEDURE — 86593 SYPHILIS TEST NON-TREP QUANT: CPT | Performed by: NURSE PRACTITIONER

## 2025-03-19 PROCEDURE — 80074 ACUTE HEPATITIS PANEL: CPT | Performed by: NURSE PRACTITIONER

## 2025-03-19 PROCEDURE — 87389 HIV-1 AG W/HIV-1&-2 AB AG IA: CPT | Performed by: NURSE PRACTITIONER

## 2025-03-19 PROCEDURE — 86780 TREPONEMA PALLIDUM: CPT | Performed by: NURSE PRACTITIONER

## 2025-03-19 PROCEDURE — 86592 SYPHILIS TEST NON-TREP QUAL: CPT | Performed by: NURSE PRACTITIONER

## 2025-03-19 RX ORDER — NYSTATIN 100000 [USP'U]/G
1 POWDER TOPICAL 4 TIMES DAILY
COMMUNITY
Start: 2025-03-06

## 2025-03-19 RX ORDER — NYSTATIN 100000 [USP'U]/G
POWDER TOPICAL 2 TIMES DAILY
Qty: 60 G | Refills: 1 | Status: SHIPPED | OUTPATIENT
Start: 2025-03-19

## 2025-03-19 RX ORDER — ETONOGESTREL AND ETHINYL ESTRADIOL VAGINAL RING .015; .12 MG/D; MG/D
1 RING VAGINAL
Qty: 1 EACH | Refills: 11 | Status: SHIPPED | OUTPATIENT
Start: 2025-03-19 | End: 2025-03-19 | Stop reason: ALTCHOICE

## 2025-03-19 NOTE — PROGRESS NOTES
HISTORY OF PRESENT ILLNESS:    Mona Rosas is a 44 y.o. female, , Patient's last menstrual period was 2025 (exact date).,  presents for a routine exam and has no complaints. Patient reports cycles occur every 4 weeks lasting 3 days using 4 pads per day. She denies any BTB. Pt reports irritation to bikini line area; denies breast, urinary or gyn issues.    Last Pap: 2023- NILM/HPV negative  History of abnormal pap: No   Last Mammogram: 01/10/2024- Negative for malignancy (BIRADS 1)  Fam hx of breast or ovarian cancer: Maternal grandmother/Paternal aunt (breast)  Last Colonoscopy: N/A    She uses annovera for birth control; been off for 2 months.   She does desire STD screening.    The patient participates in regular exercise: yes.    The patient does not smoke.    The patient wears seatbelts.     Pt denies any domestic violence.    Past Medical History:   Diagnosis Date    Anemia     Obesity (BMI 35.0-39.9 without comorbidity)      Past Surgical History:   Procedure Laterality Date    CHOLECYSTECTOMY      LAPAROSCOPIC SLEEVE GASTRECTOMY  2023    OVARIAN CYST REMOVAL      WISDOM TOOTH EXTRACTION       MEDICATIONS AND ALLERGIES:    Current Medications[1]    Review of patient's allergies indicates:  No Known Allergies    Family History   Problem Relation Name Age of Onset    Hypertension Mother      Other Mother          COVID    Heart disease Father  63    Congenital heart disease Sister      Heart attacks under age 50 Sister  12    Breast cancer Paternal Aunt      Breast cancer Maternal Grandmother  65    Lung cancer Maternal Grandfather          tobacco use    Diabetes Paternal Grandmother      Asthma Brother      No Known Problems Brother      Ovarian cancer Neg Hx      Colon cancer Neg Hx      Stroke Neg Hx      Osteoporosis Neg Hx       Social History[2]    COMPREHENSIVE GYN HISTORY:  PAP History: Denies abnormal Paps.  Infection History: Denies STDs. Denies PID.  Benign History:  Denies uterine fibroids. Reports ovarian cyst (removal). Denies endometriosis. Denies other conditions.  Cancer History: Denies cervical cancer. Denies uterine cancer or hyperplasia. Denies ovarian cancer. Denies vulvar cancer or pre-cancer. Denies vaginal cancer or pre-cancer. Denies breast cancer. Denies colon cancer.  Sexual Activity History: Reports currently being sexually active  Menstrual History: heavy first day, then moderate flow  Dysmenorrhea History: No    ROS:  GENERAL: No weight changes. No swelling. No fatigue. No fever.  CARDIOVASCULAR: No chest pain. No shortness of breath. No leg cramps.   NEUROLOGICAL: No headaches. No vision changes.  BREASTS: No pain. No lumps. No discharge.  ABDOMEN: No pain. No nausea. No vomiting. No diarrhea. No constipation.  REPRODUCTIVE: No abnormal bleeding.   VULVA: No pain. No lesions. No itching. Reports irritation to bikini line area  VAGINA: No relaxation. No itching. No odor. No discharge. No lesions.  URINARY: No incontinence. No nocturia. No frequency. No dysuria.    BP (!) 158/94 (BP Location: Left arm, Patient Position: Sitting)   Wt 109.8 kg (242 lb 2.8 oz)   LMP 03/05/2025 (Exact Date)   BMI 36.82 kg/m²     PE:  APPEARANCE: Well nourished, well developed, obese, in no acute distress.  AFFECT: WNL, alert and oriented x 3.  SKIN: No acne or hirsutism.  NECK: Neck symmetric, without masses or thyromegaly.  NODES: No inguinal, cervical, axillary or femoral lymph node enlargement.  CHEST: Good respiratory effort.   ABDOMEN: Soft. No tenderness or masses. No hepatosplenomegaly. No hernias.  BREASTS: Symmetrical, no skin changes, visible lesions, palpable masses or nipple discharge bilaterally.  PELVIC: External female genitalia without lesions; rash-like area to bikini line.  Female hair distribution. Adequate perineal body, Normal urethral meatus. Vagina moist and well rugated without lesions or discharge.  No significant cystocele or rectocele present. Cervix  "pink without lesions, discharge or tenderness. Uterus is normal, mobile and nontender. Adnexa without masses or tenderness.  EXTREMITIES: No edema    DIAGNOSIS:  1. Encntr for gyn exam (general) (routine) w abnormal findings    2. Encounter for screening mammogram for breast cancer    3. Encounter for surveillance of nuvaring    4. Encounter for general counseling and advice on contraceptive management    5. Encntr screen for infections w sexl mode of transmiss    6. Skin rash      PLAN:  -No Pap smear performed today, A.C.S. pap guidelines discussed (every 3 yrs with hx of normal paps).   -CBE performed today. Sent order for mammogram. Recommend CBE yearly and encouraged breast self-exam/awareness. Pt verbalized understanding.  -BC: Pt desires to restart on annovera ring for BC. Will send to pharmacy. Discussed benefits, risks, side effects, proper use/application, BUM x 7 days. Pt verbalized understanding. "Quick start" desired. Patient reports UPI since LMP. UPT is negative. Reviewed with patient risk early pregnancy and wants to start method today. Recommend pregnancy test in 3 wks to r/o early pregnancy. Patient verbalized understanding.   -GC/CT culture performed today; STI blood labs pending.    PROBLEM VISIT INFO:  Dx: skin rash (R21): Sent nystatin powder to pharmacy for skin rash to groin area  CPT code: 30612-58 modifier    Orders Placed This Encounter    Mammo Digital Screening Bilat w/ Vinny (XPD)    C. trachomatis/N. gonorrhoeae by AMP DNA    Hepatitis Panel, Acute    HIV 1/2 Ag/Ab (4th Gen)    Treponema Pallidium Antibodies IgG, IgM    segesterone ac-ethin estradioL 0.15-0.013 mg/24 hour Ring    nystatin (MYCOSTATIN) powder     COUNSELING:  A.C.S. pap guidelines discussed (every 3 yrs with hx of normal paps). Recommend yearly mammograms.     FOLLOW-UP with me annually and follow up with PCP for other health maintenance.    Sallie Rodriguez, SANDRA, RN, APRN, WHNP-BC [facility] [place]         [1]   Current Outpatient Medications:     nystatin (MYCOSTATIN) powder, Apply 1 application  topically 4 (four) times daily., Disp: , Rfl:     ergocalciferol (ERGOCALCIFEROL) 50,000 unit Cap, Take 50,000 Units by mouth every 7 days., Disp: , Rfl:     ferrous gluconate (FERGON) 324 MG tablet, Take 1 tablet by mouth every morning. (Patient not taking: Reported on 3/19/2025), Disp: , Rfl:     fluticasone propionate (FLONASE) 50 mcg/actuation nasal spray, 1 spray (50 mcg total) by Each Nostril route 2 (two) times daily., Disp: 15 g, Rfl: 0    metFORMIN (GLUCOPHAGE-XR) 500 MG ER 24hr tablet, Take 2 tablets (1,000 mg total) by mouth 2 (two) times daily with meals. (Patient not taking: Reported on 12/7/2023), Disp: 360 tablet, Rfl: 3    nystatin (MYCOSTATIN) powder, Apply topically 2 (two) times daily., Disp: 60 g, Rfl: 1    pediatric multivitamin chewable tablet, Take 1 tablet by mouth once daily., Disp: , Rfl:     segesterone ac-ethin estradioL 0.15-0.013 mg/24 hour Ring, Leave in ring for 21 days, remove for 7 days and repeat monthly for 11 more months, Disp: 1 each, Rfl: 0  [2]   Social History  Socioeconomic History    Marital status:    Tobacco Use    Smoking status: Never    Smokeless tobacco: Never   Substance and Sexual Activity    Alcohol use: Yes     Comment: rare    Drug use: Never    Sexual activity: Yes     Partners: Male     Birth control/protection: None

## 2025-03-20 ENCOUNTER — HOSPITAL ENCOUNTER (OUTPATIENT)
Dept: RADIOLOGY | Facility: HOSPITAL | Age: 45
Discharge: HOME | End: 2025-03-20
Attending: NURSE PRACTITIONER
Payer: MEDICAID

## 2025-03-20 ENCOUNTER — RESULTS FOLLOW-UP (OUTPATIENT)
Dept: OBSTETRICS AND GYNECOLOGY | Facility: CLINIC | Age: 45
End: 2025-03-20

## 2025-03-20 VITALS — WEIGHT: 240 LBS | BODY MASS INDEX: 36.49 KG/M2

## 2025-03-20 DIAGNOSIS — Z12.31 ENCOUNTER FOR SCREENING MAMMOGRAM FOR BREAST CANCER: ICD-10-CM

## 2025-03-20 LAB — RPR SER QL: NORMAL

## 2025-03-20 PROCEDURE — 77067 SCR MAMMO BI INCL CAD: CPT | Mod: 26,,, | Performed by: RADIOLOGY

## 2025-03-20 PROCEDURE — 77063 BREAST TOMOSYNTHESIS BI: CPT | Mod: 26,,, | Performed by: RADIOLOGY

## 2025-03-20 PROCEDURE — 77067 SCR MAMMO BI INCL CAD: CPT | Mod: TC

## 2025-03-21 LAB
C TRACH DNA SPEC QL NAA+PROBE: NOT DETECTED
N GONORRHOEA DNA SPEC QL NAA+PROBE: NOT DETECTED
T PALLIDUM AB SER QL IF: REACTIVE

## 2025-05-19 ENCOUNTER — OFFICE VISIT (OUTPATIENT)
Dept: INTERNAL MEDICINE | Facility: CLINIC | Age: 45
End: 2025-05-19
Payer: MEDICAID

## 2025-05-19 VITALS
SYSTOLIC BLOOD PRESSURE: 130 MMHG | WEIGHT: 244.69 LBS | OXYGEN SATURATION: 99 % | HEART RATE: 77 BPM | BODY MASS INDEX: 37.21 KG/M2 | DIASTOLIC BLOOD PRESSURE: 72 MMHG

## 2025-05-19 DIAGNOSIS — Z90.3 S/P GASTRIC SLEEVE PROCEDURE: ICD-10-CM

## 2025-05-19 DIAGNOSIS — E53.8 VITAMIN B12 DEFICIENCY: ICD-10-CM

## 2025-05-19 DIAGNOSIS — Z23 NEED FOR VACCINATION: ICD-10-CM

## 2025-05-19 DIAGNOSIS — R00.2 PALPITATIONS: ICD-10-CM

## 2025-05-19 DIAGNOSIS — Z00.00 HEALTH MAINTENANCE EXAMINATION: Primary | ICD-10-CM

## 2025-05-19 DIAGNOSIS — Z12.11 SCREENING FOR COLORECTAL CANCER: ICD-10-CM

## 2025-05-19 DIAGNOSIS — Z12.12 SCREENING FOR COLORECTAL CANCER: ICD-10-CM

## 2025-05-19 DIAGNOSIS — E65 SYMPTOMATIC ABDOMINAL PANNICULUS: ICD-10-CM

## 2025-05-19 DIAGNOSIS — N92.0 MENORRHAGIA WITH REGULAR CYCLE: ICD-10-CM

## 2025-05-19 DIAGNOSIS — R01.1 HEART MURMUR: ICD-10-CM

## 2025-05-19 DIAGNOSIS — E55.9 VITAMIN D DEFICIENCY: ICD-10-CM

## 2025-05-19 PROCEDURE — 99999PBSHW PR PBB SHADOW TECHNICAL ONLY FILED TO HB: Mod: PBBFAC,,,

## 2025-05-19 PROCEDURE — 90715 TDAP VACCINE 7 YRS/> IM: CPT | Mod: PBBFAC

## 2025-05-19 PROCEDURE — 99999 PR PBB SHADOW E&M-EST. PATIENT-LVL IV: CPT | Mod: PBBFAC,,, | Performed by: STUDENT IN AN ORGANIZED HEALTH CARE EDUCATION/TRAINING PROGRAM

## 2025-05-19 PROCEDURE — 90471 IMMUNIZATION ADMIN: CPT | Mod: PBBFAC

## 2025-05-19 PROCEDURE — 99214 OFFICE O/P EST MOD 30 MIN: CPT | Mod: PBBFAC | Performed by: STUDENT IN AN ORGANIZED HEALTH CARE EDUCATION/TRAINING PROGRAM

## 2025-05-19 RX ORDER — ETONOGESTREL AND ETHINYL ESTRADIOL VAGINAL RING .015; .12 MG/D; MG/D
1 RING VAGINAL
Qty: 3 EACH | Refills: 3 | Status: SHIPPED | OUTPATIENT
Start: 2025-05-19

## 2025-05-19 RX ORDER — FERROUS GLUCONATE 324(38)MG
324 TABLET ORAL
Qty: 36 TABLET | Refills: 1 | Status: SHIPPED | OUTPATIENT
Start: 2025-05-19

## 2025-05-19 RX ADMIN — CLOSTRIDIUM TETANI TOXOID ANTIGEN (FORMALDEHYDE INACTIVATED), CORYNEBACTERIUM DIPHTHERIAE TOXOID ANTIGEN (FORMALDEHYDE INACTIVATED), BORDETELLA PERTUSSIS TOXOID ANTIGEN (GLUTARALDEHYDE INACTIVATED), BORDETELLA PERTUSSIS FILAMENTOUS HEMAGGLUTININ ANTIGEN (FORMALDEHYDE INACTIVATED), BORDETELLA PERTUSSIS PERTACTIN ANTIGEN, AND BORDETELLA PERTUSSIS FIMBRIAE 2/3 ANTIGEN 0.5 ML: 5; 2; 2.5; 5; 3; 5 INJECTION, SUSPENSION INTRAMUSCULAR at 02:05

## 2025-05-19 NOTE — PROGRESS NOTES
Subjective:       Patient ID: Mona Rosas is a 44 y.o. female.    Chief Complaint: Health maintenance examination [Z00.00]    Patient is established with me, here today for the following:    History of Present Illness      WEIGHT MANAGEMENT:  She reports significant weight loss of 100 lbs since last visit and desires to lose 30 more lbs. Following bariatric sleeve surgery from 2 years ago, she continues to have limited food intake capacity. She cooks at home more frequently than eating out and consumes large quantities of water. She acknowledges being physically inactive but expresses a goal to become more active.    MENSTRUAL HISTORY:  She experiences heavy menstrual cycles with severe cramps, described as pregnancy-like in intensity, lasting 4-6 days. She reports irregular cycles, including instances of bleeding twice in close succession. She was without birth control for 4 months and expresses disinterest in using recently prescribed birth control ring.    CARDIAC:  She experiences occasional palpitations, typically occurring when she overeats or drinks too much at once.    LABS:  March labs showed normal A1C. Iron and ferritin levels were low.    MEDICATIONS:  She takes bariatric vitamin but reports inconsistent use due to difficulty swallowing pills since sleeve surgery.      ROS:  Eyes: -vision changes  ENT: -ear pain, -sore throat  Cardiovascular: -chest pain, +palpitations  Gastrointestinal: +difficulty swallowing  Female Genitourinary: +periods lasting more than 7 days, +menstrual pain or symptoms, +excessive cramping, +absent or irregular periods, +abnormal menses        Health maintenance -   Never previously underwent colorectal cancer screening.  Denies family history of colorectal cancer.   Mammogram BI-RADS 1 in MAR2025.  Denies history of abnormal mammogram.  Family history of breast cancers.  Denies family history of ovarian cancers.  Last pap performed DEC2023.   Denies history of prior abnormal  pap smears.  Denies family history of osteoporosis.  Significant family history of cardiac disease.  UTD on COVID primary vaccinations.  Due for COVID, Tdap vaccinations.  Never smoker.  Denies drug use.  Completed HIV and hepatitis C screening.  Lab Results   Component Value Date    LDLCALC 78 2025   The 10-year ASCVD risk score (Chapin POLLOCK, et al., 2019) is: 2.6%    Began menarche at age 11.   [id number]  Denies gestational diabetes.  Endorses preeclampsia.   Currently sexually active with .      Prediabetes -  Lab Results   Component Value Date    HGBA1C 5.3 2025    HGBA1C 5.7 (H) 2024    HGBA1C 5.1 2023      Anemia -   Vitamin D deficiency -  Vitamin B12 deficiency -   History of gastric sleeve surgery -   Underwent gastric sleeve with Dr. Escobedo 2023  Lab Results   Component Value Date    HGB 10.8 (L) 2024    HCT 34.5 (L) 2024    MCV 81.8 2024    RDW 14.1 2024     Lab Results   Component Value Date    IRON 38 2023    TRANSFERRIN 304 2023    TIBC 450 2023    FESATURATED 8 (L) 2023      Lab Results   Component Value Date    FERRITIN 25 2023     Lab Results   Component Value Date    SOHHIHJZ23 298 2023     Lab Results   Component Value Date    FOLATE 14.6 2023     Lab Results   Component Value Date    IGQOSZEP46QQ 28 (L) 2023       Current Outpatient Medications   Medication Instructions    ergocalciferol (ERGOCALCIFEROL) 50,000 Units, Every 7 days    ferrous gluconate (FERGON) 324 MG tablet 1 tablet, Every morning    fluticasone propionate (FLONASE) 50 mcg, Each Nostril, 2 times daily    metFORMIN (GLUCOPHAGE-XR) 1,000 mg, Oral, 2 times daily with meals    nystatin (MYCOSTATIN) powder 1 application , 4 times daily    nystatin (MYCOSTATIN) powder Topical (Top), 2 times daily    pediatric multivitamin chewable tablet 1 tablet, Daily    segesterone ac-ethin estradioL 0.15-0.013 mg/24 hour Ring Leave in ring for  21 days, remove for 7 days and repeat monthly for 11 more months     Objective:      Vitals:    05/19/25 1356   BP: 130/72   Pulse: 77   SpO2: 99%   Weight: 111 kg (244 lb 11.4 oz)   PainSc:   6   PainLoc: Knee     Body mass index is 37.21 kg/m².    Physical Exam  Vitals reviewed.   Constitutional:       General: She is not in acute distress.     Appearance: Normal appearance. She is not ill-appearing or diaphoretic.   HENT:      Head: Normocephalic and atraumatic.      Right Ear: Tympanic membrane, ear canal and external ear normal. There is no impacted cerumen.      Left Ear: Tympanic membrane, ear canal and external ear normal. There is no impacted cerumen.      Nose: Nose normal. No rhinorrhea.      Mouth/Throat:      Mouth: Mucous membranes are moist.      Pharynx: Oropharynx is clear. No oropharyngeal exudate or posterior oropharyngeal erythema.   Eyes:      General: No scleral icterus.        Right eye: No discharge.         Left eye: No discharge.      Conjunctiva/sclera: Conjunctivae normal.   Neck:      Thyroid: No thyromegaly or thyroid tenderness.      Trachea: Trachea normal.   Cardiovascular:      Rate and Rhythm: Normal rate and regular rhythm.      Heart sounds: Murmur heard.   Pulmonary:      Effort: Pulmonary effort is normal. No respiratory distress.      Breath sounds: Normal breath sounds. No stridor. No wheezing, rhonchi or rales.   Abdominal:      General: There is no distension.      Palpations: Abdomen is soft.      Tenderness: There is no abdominal tenderness. There is no guarding or rebound.   Musculoskeletal:         General: No swelling or deformity.      Cervical back: Neck supple.   Lymphadenopathy:      Head:      Right side of head: No submandibular or posterior auricular adenopathy.      Left side of head: No submandibular or posterior auricular adenopathy.      Cervical: No cervical adenopathy.      Right cervical: No superficial, deep or posterior cervical adenopathy.     Left  cervical: No superficial, deep or posterior cervical adenopathy.      Upper Body:      Right upper body: No supraclavicular adenopathy.      Left upper body: No supraclavicular adenopathy.   Skin:     General: Skin is warm and dry.   Neurological:      General: No focal deficit present.      Mental Status: She is alert. Mental status is at baseline.      Gait: Gait normal.   Psychiatric:         Mood and Affect: Mood normal.         Behavior: Behavior normal.         Assessment:       1. Health maintenance examination    2. Menorrhagia with regular cycle    3. Screening for colorectal cancer    4. Need for vaccination    5. Vitamin B12 deficiency    6. Vitamin D deficiency    7. S/P gastric sleeve procedure    8. Symptomatic abdominal panniculus    9. Heart murmur    10. Palpitations        Plan:   Health maintenance examination    Menorrhagia with regular cycle  -     etonogestreL-ethinyl estradioL (NUVARING) 0.12-0.015 mg/24 hr vaginal ring; Place 1 each vaginally every 28 days.  Dispense: 3 each; Refill: 3  -     CBC Auto Differential; Future; Expected date: 07/07/2025  -     Iron and TIBC; Future; Expected date: 07/07/2025  -     Ferritin; Future; Expected date: 07/07/2025  -     Vitamin B12; Future; Expected date: 07/07/2025  -     Comprehensive Metabolic Panel; Future; Expected date: 07/07/2025  -     ferrous gluconate (FERGON) 324 MG tablet; Take 1 tablet (324 mg total) by mouth every Mon, Wed, Fri.  Dispense: 36 tablet; Refill: 1    Screening for colorectal cancer  -     Ambulatory referral/consult to Endo Procedure ; Future; Expected date: 05/20/2025    Need for vaccination  -     Tdap (BOOSTRIX) vaccine injection 0.5 mL    Vitamin B12 deficiency    Vitamin D deficiency    S/P gastric sleeve procedure  -     Ambulatory referral/consult to Plastic Surgery; Future; Expected date: 05/26/2025    Symptomatic abdominal panniculus  -     Ambulatory referral/consult to Plastic Surgery; Future; Expected  date: 05/26/2025    Heart murmur  -     Echo Saline Bubble? No; Ultrasound enhancing contrast? No; Future    Palpitations  -     Echo Saline Bubble? No; Ultrasound enhancing contrast? No; Future        Assessment & Plan      PLAN SUMMARY:  - Referred  for plastic surgery consultation  - Prescribed iron tablet to be taken Monday, Wednesday, Friday  - Continue multivitamin with iron (bariatric vitamin)  - Ordered colonoscopy for colorectal cancer screening  - Ordered repeat labs to recheck iron levels and blood counts in July  - Prescribed Nuvaring for 3-month supply, change monthly  - Administered tetanus vaccine  - Ordered echocardiogram to evaluate heart murmur and palpitations  - Follow-up in mid to end of July to review lab results, echocardiogram findings, and assess menstrual cycles    ## BARIATRIC SURGERY STATUS:  - Monitored the patient's significant weight loss of over 100 lbs since the last visit, indicating successful post-bariatric surgery progress.  - Noted the patient had a two-year follow-up with bariatric clinic in March.  - Referred the patient for plastic surgery consultation regarding excess skin.  - Evaluated and discussed dysphagia (difficulty swallowing pills) with the patient.    ## IRON DEFICIENCY ANEMIA:  - Monitored the patient's low iron levels requiring blood count rechecking.  - Prescribed iron tablet to be taken Monday, Wednesday, Friday to minimize side effects while continuing multivitamin with iron.  - Ordered repeat labs to recheck iron levels and blood counts in July.  - Instructed the patient to contact office if experiencing any concerning symptoms related to heart murmur or anemia.    ## EXCESSIVE AND FREQUENT MENSTRUATION WITH IRREGULAR CYCLE:  - Monitored the patient's heavy menstrual cycles lasting 10 to 14 days with unprecedented cramps.  - Discussed menstrual cycle irregularities and potential link to menopause.  - Prescribed Nuvaring for 3-month supply with instructions to  change monthly to regulate menstrual cycles.    ## CARDIAC MURMUR:  - Monitored heart murmur detected during exam.  - Ordered echocardiogram to evaluate heart murmur.    ## PALPITATIONS:  - Monitored the patient's reports of occasional palpitations, sometimes related to overeating or drinking too much at once.  - Evaluated possible connection to anemia and low blood counts.  - Ordered echocardiogram to evaluate palpitations along with the heart murmur.    ## ENLARGED LYMPH NODES:  - Monitored slightly enlarged lymph nodes during exam.  - Noted patient reported no changes in hearing or vision, no allergies, ear pain, or throat pain.  - Discussed potential causes with the patient.    ## WEIGHT MANAGEMENT:  - Monitored the patient's significant weight loss of over 100 lbs since the last visit, indicating a significant reduction in BMI.  - Evaluated how weight reduction has likely contributed to improved blood pressure, A1C, and cholesterol levels based on recent lab results.  - Discussed the positive impact of weight loss on overall health.  - Recommend continuing healthy eating habits and water intake while increasing physical activity, such as evening walks.    ## PREVENTIVE CARE:  - Administered tetanus vaccine during visit.  - Discussed colorectal cancer screening options given patient's age and lack of family history, explaining differences between colonoscopy and Cologuard home testing kit.  - Described the colonoscopy process, including the use of twilight anesthesia.  - Ordered colonoscopy for colorectal cancer screening.    ## FOLLOW-UP:  - Scheduled follow up in mid to end of July to review lab results, echocardiogram findings, and assess menstrual cycles.  - Instructed the patient to contact office if experiencing any concerning symptoms.           Nida Astorga MD  5/19/2025

## 2025-05-19 NOTE — PROGRESS NOTES
After obtaining consent, and per orders of Dr. Astorga, injection of Tdap Lot [id number] 01/31/2027 given in the RD by Caitlin Will LPN. Patient tolerated well and band aid applied. Patient instructed to remain in clinic for 15 minutes afterwards, and to report any adverse reaction to me immediately.

## 2025-05-22 ENCOUNTER — CLINICAL SUPPORT (OUTPATIENT)
Dept: ENDOSCOPY | Facility: HOSPITAL | Age: 45
End: 2025-05-22
Attending: STUDENT IN AN ORGANIZED HEALTH CARE EDUCATION/TRAINING PROGRAM
Payer: MEDICAID

## 2025-05-22 ENCOUNTER — TELEPHONE (OUTPATIENT)
Dept: ENDOSCOPY | Facility: HOSPITAL | Age: 45
End: 2025-05-22

## 2025-05-22 VITALS — HEIGHT: 70 IN | BODY MASS INDEX: 34.93 KG/M2 | WEIGHT: 244 LBS

## 2025-05-22 DIAGNOSIS — Z12.11 ENCOUNTER FOR SCREENING COLONOSCOPY: Primary | ICD-10-CM

## 2025-05-22 DIAGNOSIS — Z12.12 SCREENING FOR COLORECTAL CANCER: ICD-10-CM

## 2025-05-22 DIAGNOSIS — Z12.11 SCREENING FOR COLORECTAL CANCER: ICD-10-CM

## 2025-05-22 RX ORDER — POLYETHYLENE GLYCOL 3350, SODIUM SULFATE ANHYDROUS, SODIUM BICARBONATE, SODIUM CHLORIDE, POTASSIUM CHLORIDE 236; 22.74; 6.74; 5.86; 2.97 G/4L; G/4L; G/4L; G/4L; G/4L
4 POWDER, FOR SOLUTION ORAL ONCE
Qty: 4000 ML | Refills: 0 | Status: SHIPPED | OUTPATIENT
Start: 2025-05-22 | End: 2025-05-22

## 2025-05-22 NOTE — PATIENT INSTRUCTIONS
.    Colonoscopy Procedure Prep Instructions    Date of procedure: 6/11/25 Arrive at: 10:00 AM    Location of Department:   [facility] [address], [place] [address]  Take the Elevators to 2nd Floor Endoscopy Procedural Area    As soon as possible:   your prep from pharmacy and over the counter DULCOLAX LAXATIVE TABLETS            On the day before your procedure   What You CAN do:   You may have clear liquids ONLY-see below for list.     Liquids That Are OK to Drink:   Water  Sports drinks (Gatorade, Power-Aid)  Coffee or tea (no cream or nondairy creamer)  Clear juices without pulp (apple, white grape)  Gelatin desserts (no fruit or toppings)  Clear soda (sprite, coke, ginger ale)  Chicken broth (until 12 midnight the night before procedure)    What You CANNOT do:   Do not EAT solid food, drink milk or anything   colored red.  Do not drink alcohol.  Do not take oral medications within 1 hour of starting   each dose of prep.  No gum chewing or candy morning of procedure                       Note:   (Please disregard the insert instructions from pharmacy).  PEG Bowel Prep is indicated for cleansing of the colon as a preparation for colonoscopy in adults.   Be sure to tell your doctor about all the medicines you take, including prescription and non-prescription medicines, vitamins, and herbal supplements. PEG Bowel Prep may affect how other medicines work.  Medication taken by mouth may not be absorbed properly when taken within 1 hour before the start of each dose of PEG Bowel Prep.    It is not uncommon to experience some abdominal cramping, nausea and/or vomiting when taking the prep. If you have nausea and/or vomiting while taking the prep, stop drinking for 20 to 30 minutes then continue.      How to take prep:    PEG Bowel Prep is a (2-day) prep.    One (1) bottle of prep are required for a complete preparation for colonoscopy. Dilute the solution concentrate as directed  prior to use. You must drink water with each dose of prep, and additional water after each dose.    DOSE 1--Day Before Colonoscopy 6/10/25     Drink at least 6 to 8 glasses of clear liquids from time you wake up until you begin your prep and then continue until bedtime to avoid dehydration.     12:00 pm (NOON) Mix your entire container of prep with lukewarm water and refrigerate. Take four (4) Dulcolax (Bisacodyl) tablets with at least 8 ounces or more of clear liquids.       6:00 pm:    You must complete Steps 1 and 2 below before going to bed:    Step 1-Drink half the liquid in the container within one (1) hour.   Step 2-Refrigerate the remaining half of the liquid for dose 2. See below when to begin this step.                       IMPORTANT: If you experience preparation-related symptoms (for example, nausea, bloating, or cramping), stop, or slow the rate of drinking the additional water until your symptoms decrease.    DOSE 2--Day of the Colonoscopy 6/11/25 at 2-3 AM.    For this dose, repeat Step 1 shown above using the remaining half of the liquid prep.   You may continue drinking water/clear liquids until   4 hours before your colonoscopy or as directed by the scheduling nurse  7:00 AM.      For information about your procedure, two (2) things to view prior to colonoscopy:  Please watch this informational video. It is important to watch this animated consent video prior to your arrival. If you haven't watched the video prior to arriving, you are required to watch it during admission which can causes delays.    Options for viewing:   Using a keyboard:  press and hold the control tab (Ctrl) and left mouse click to follow links.           Colonoscopy Instructional Video                                                                                   OR    Type link address into your web browser's address bar:  [email or web address]      Educational Booklet with  pictures:      Colonoscopy Prep - Liquid      Comments:   .     IMPORTANT INFORMATION TO KNOW BEFORE YOUR PROCEDURE    [facility] 2nd Floor         If your procedure requires the administration of anesthesia, it is necessary for a responsible adult to drive you home. (Medical Transportation, Uber, Lyft, Taxi, etc. may ONLY be used if a responsible adult is present to accompany you home.  The responsible adult CAN'T be the  of the service).      person must be available to return to pick you up within 15 minutes of being notified of discharge.       Please bring a picture ID, insurance card, & copayment      Take Medications as directed below:          If you are taking the oral medication(s):   Invokana (Canagliflozin), Farxiga (Dapagliflozin), Jardiance (Empagliflozin), Invokamet/Invokamet XR (invokana +metformin), Xigduo (farxiga + metformin), Synjardy XR (jardiance + metformin), hold 3 days prior to your procedure, please stop taking on 6/11/25.         If you begin taking any blood thinning medications, injectable weight loss/diabetes medications (other than insulin) , Adipex (Phentermine) , please contact the endoscopy scheduling department listed below as soon as possible.    If you are diabetic see the attached instruction sheet regarding your medication.     If you take HEART, BLOOD PRESSURE, SEIZURE, PAIN, LUNG (including inhalers/nebulizers), ANTI-REJECTION (transplant patients), or PSYCHIATRIC medications, please take at your regular times with a sip of water or as directed by the scheduling nurse.     Important contact information:    Endoscopy [phone] Hours of operation Monday-Friday 8:00-4:30pm.    Questions about insurance or financial obligations call [phone] or [phone].    If you have questions regarding the prep or need to reschedule, please call [phone]. After hours questions requiring immediate assistance, contact [facility]  On-Call nurse line at [phone] or [phone].   NOTE:     On occasion, unforeseen circumstances may cause a delay in your procedure start time. We respect your time and appreciate your patience during these circumstances.      Comments:

## 2025-05-30 ENCOUNTER — ANESTHESIA EVENT (OUTPATIENT)
Dept: ENDOSCOPY | Facility: HOSPITAL | Age: 45
End: 2025-05-30
Payer: MEDICAID

## 2025-05-30 NOTE — ANESTHESIA PREPROCEDURE EVALUATION
05/30/2025  Mona Rosas is a 44 y.o., female.    Past Medical History:   Diagnosis Date    Anemia     Obesity (BMI 35.0-39.9 without comorbidity)      Patient Active Problem List   Diagnosis    Cyst of ovary    Menorrhagia    Vitamin D deficiency    Prediabetes    Type 2 diabetes mellitus without complication, without long-term current use of insulin     Past Surgical History:   Procedure Laterality Date    CHOLECYSTECTOMY  2012    LAPAROSCOPIC SLEEVE GASTRECTOMY  03/01/2023    OVARIAN CYST REMOVAL  1997    WISDOM TOOTH EXTRACTION         Pre-op Assessment    I have reviewed the Patient Summary Reports.    I have reviewed the NPO Status.   I have reviewed the Medications.     Review of Systems  Anesthesia Hx:  No problems with previous Anesthesia             Denies Family Hx of Anesthesia complications.    Denies Personal Hx of Anesthesia complications.                    Hematology/Oncology:    Oncology Normal    -- Anemia:                                  EENT/Dental:  EENT/Dental Normal           Cardiovascular:  Cardiovascular Normal                  ECG has been reviewed.                            Pulmonary:  Pulmonary Normal                       Renal/:  Renal/ Normal                 Hepatic/GI:  Hepatic/GI Normal                    Musculoskeletal:  Musculoskeletal Normal                Neurological:  Neurology Normal                                      Endocrine:  Diabetes         Obesity / BMI > 30  Dermatological:  Skin Normal    Psych:  Psychiatric Normal                  Physical Exam  General: Well nourished    Airway:  Mallampati: II   Mouth Opening: Normal  TM Distance: Normal    Chest/Lungs:  Normal Respiratory Rate    Heart:  Rate: Normal    Anesthesia Plan  Type of Anesthesia, risks & benefits discussed:    Anesthesia Type: Gen Natural Airway  Intra-op Monitoring Plan:  Standard ASA Monitors  Post Op Pain Control Plan: multimodal analgesia and IV/PO Opioids PRN  Induction:  IV  Informed Consent: Informed consent signed with the Patient and all parties understand the risks and agree with anesthesia plan.  All questions answered. Patient consented to blood products? No  ASA Score: 2  Day of Surgery Review of History & Physical: H&P Update referred to the surgeon/provider.    Ready For Surgery From Anesthesia Perspective.     .

## 2025-06-04 ENCOUNTER — PATIENT MESSAGE (OUTPATIENT)
Dept: ENDOSCOPY | Facility: HOSPITAL | Age: 45
End: 2025-06-04
Payer: MEDICAID

## 2025-06-11 ENCOUNTER — HOSPITAL ENCOUNTER (OUTPATIENT)
Facility: HOSPITAL | Age: 45
Discharge: HOME | End: 2025-06-11
Attending: STUDENT IN AN ORGANIZED HEALTH CARE EDUCATION/TRAINING PROGRAM | Admitting: STUDENT IN AN ORGANIZED HEALTH CARE EDUCATION/TRAINING PROGRAM
Payer: MEDICAID

## 2025-06-11 ENCOUNTER — ANESTHESIA (OUTPATIENT)
Dept: ENDOSCOPY | Facility: HOSPITAL | Age: 45
End: 2025-06-11
Payer: MEDICAID

## 2025-06-11 VITALS
HEART RATE: 48 BPM | SYSTOLIC BLOOD PRESSURE: 202 MMHG | HEIGHT: 69 IN | TEMPERATURE: 98 F | OXYGEN SATURATION: 100 % | RESPIRATION RATE: 16 BRPM | BODY MASS INDEX: 35.1 KG/M2 | WEIGHT: 237 LBS | DIASTOLIC BLOOD PRESSURE: 102 MMHG

## 2025-06-11 DIAGNOSIS — Z12.11 COLON CANCER SCREENING: Primary | ICD-10-CM

## 2025-06-11 LAB
B-HCG UR QL: NEGATIVE
CTP QC/QA: YES

## 2025-06-11 PROCEDURE — 94761 N-INVAS EAR/PLS OXIMETRY MLT: CPT

## 2025-06-11 PROCEDURE — 45378 DIAGNOSTIC COLONOSCOPY: CPT | Mod: ,,, | Performed by: STUDENT IN AN ORGANIZED HEALTH CARE EDUCATION/TRAINING PROGRAM

## 2025-06-11 PROCEDURE — G0121 COLON CA SCRN NOT HI RSK IND: HCPCS | Performed by: STUDENT IN AN ORGANIZED HEALTH CARE EDUCATION/TRAINING PROGRAM

## 2025-06-11 PROCEDURE — 99900035 HC TECH TIME PER 15 MIN (STAT)

## 2025-06-11 PROCEDURE — 37000008 HC ANESTHESIA 1ST 15 MINUTES: Performed by: STUDENT IN AN ORGANIZED HEALTH CARE EDUCATION/TRAINING PROGRAM

## 2025-06-11 PROCEDURE — 25000003 PHARM REV CODE 250: Performed by: NURSE ANESTHETIST, CERTIFIED REGISTERED

## 2025-06-11 PROCEDURE — 81025 URINE PREGNANCY TEST: CPT | Performed by: STUDENT IN AN ORGANIZED HEALTH CARE EDUCATION/TRAINING PROGRAM

## 2025-06-11 PROCEDURE — 37000009 HC ANESTHESIA EA ADD 15 MINS: Performed by: STUDENT IN AN ORGANIZED HEALTH CARE EDUCATION/TRAINING PROGRAM

## 2025-06-11 PROCEDURE — 63600175 PHARM REV CODE 636 W HCPCS: Performed by: NURSE ANESTHETIST, CERTIFIED REGISTERED

## 2025-06-11 RX ORDER — LIDOCAINE HYDROCHLORIDE 20 MG/ML
INJECTION INTRAVENOUS
Status: DISCONTINUED | OUTPATIENT
Start: 2025-06-11 | End: 2025-06-11

## 2025-06-11 RX ORDER — SODIUM CHLORIDE 9 MG/ML
INJECTION, SOLUTION INTRAVENOUS CONTINUOUS
Status: DISCONTINUED | OUTPATIENT
Start: 2025-06-11 | End: 2025-06-11 | Stop reason: HOSPADM

## 2025-06-11 RX ORDER — PROPOFOL 10 MG/ML
VIAL (ML) INTRAVENOUS
Status: DISCONTINUED | OUTPATIENT
Start: 2025-06-11 | End: 2025-06-11

## 2025-06-11 RX ADMIN — LIDOCAINE HYDROCHLORIDE 40 MG: 20 INJECTION INTRAVENOUS at 11:06

## 2025-06-11 RX ADMIN — GLYCOPYRROLATE 0.2 MG: 0.2 INJECTION, SOLUTION INTRAMUSCULAR; INTRAVENOUS at 11:06

## 2025-06-11 RX ADMIN — PROPOFOL 80 MG: 10 INJECTION, EMULSION INTRAVENOUS at 11:06

## 2025-06-11 RX ADMIN — SODIUM CHLORIDE: 0.9 INJECTION, SOLUTION INTRAVENOUS at 11:06

## 2025-06-11 RX ADMIN — PROPOFOL 175 MCG/KG/MIN: 10 INJECTION, EMULSION INTRAVENOUS at 11:06

## 2025-06-11 NOTE — H&P
Short Stay Endoscopy History and Physical    PCP - Nida Astorga MD  Referring Physician - Nida Astorga MD  [address]  [address]  [place] [address]    Procedure - Colonoscopy  ASA - per anesthesia  Mallampati - per anesthesia  History of Anesthesia problems - no  Family history Anesthesia problems -  no   Plan of anesthesia - General    HPI  44 y.o. female  Reason for procedure:   Screening for colorectal cancer [Z12.11, Z12.12]        ROS:  Constitutional: No fevers, chills, No weight loss  CV: No chest pain  Pulm: No cough, No shortness of breath  GI: see HPI    Medical History:  has a past medical history of Anemia and Obesity (BMI 35.0-39.9 without comorbidity).    Surgical History:  has a past surgical history that includes Ovarian cyst removal (1997); Cholecystectomy (2012); [place] tooth extraction; and Laparoscopic sleeve gastrectomy (03/01/2023).    Family History: family history includes Asthma in her brother; Breast cancer in her paternal aunt; Breast cancer (age of onset: 65) in her maternal grandmother; Congenital heart disease in her sister; Diabetes in her paternal grandmother; Heart attacks under age 50 (age of onset: 12) in her sister; Heart disease (age of onset: 63) in her father; Hypertension in her mother; Lung cancer in her maternal grandfather; No Known Problems in her brother; Other in her mother..    Social History:  reports that she has never smoked. She has never used smokeless tobacco. She reports current alcohol use. She reports that she does not use drugs.    Review of patient's allergies indicates:  No Known Allergies    Medications:   Prescriptions Prior to Admission[1]    Physical Exam:    Vital Signs:   Vitals:    06/11/25 1035   BP: (!) 169/99   Pulse:    Resp:    Temp:        General Appearance: Well appearing in no acute distress  Abdomen: Soft, non tender, non distended with normal bowel sounds, no masses    Labs:  Lab Results   Component Value Date     WBC 8.1 03/05/2024    HGB 10.8 (L) 03/05/2024    HCT 34.5 (L) 03/05/2024     02/01/2023    CHOL 156 03/06/2025    TRIG 136 03/06/2025    HDL 51 03/06/2025    ALT 11 03/06/2025    AST 17 03/06/2025     03/06/2025    K 4.0 03/06/2025     02/01/2023    CREATININE 0.51 03/06/2025    BUN 8.0 03/06/2025    CO2 29 03/06/2025    TSH 0.92 03/06/2025    INR 1.0 11/26/2020    HGBA1C 5.3 03/06/2025       I have explained the risks and benefits of this endoscopic procedure to the patient including but not limited to bleeding, inflammation, infection, perforation, and death.      René Murray MD       [1]   Medications Prior to Admission   Medication Sig Dispense Refill Last Dose/Taking    pediatric multivitamin chewable tablet Take 1 tablet by mouth once daily.   Past Week    ergocalciferol (ERGOCALCIFEROL) 50,000 unit Cap Take 50,000 Units by mouth every 7 days.   More than a month    etonogestreL-ethinyl estradioL (NUVARING) 0.12-0.015 mg/24 hr vaginal ring Place 1 each vaginally every 28 days. 3 each 3     ferrous gluconate (FERGON) 324 MG tablet Take 1 tablet by mouth every morning. (Patient not taking: Reported on 3/19/2025)   More than a month    ferrous gluconate (FERGON) 324 MG tablet Take 1 tablet (324 mg total) by mouth every Mon, Wed, Fri. 36 tablet 1 More than a month    fluticasone propionate (FLONASE) 50 mcg/actuation nasal spray 1 spray (50 mcg total) by Each Nostril route 2 (two) times daily. 15 g 0 More than a month    metFORMIN (GLUCOPHAGE-XR) 500 MG ER 24hr tablet Take 2 tablets (1,000 mg total) by mouth 2 (two) times daily with meals. (Patient not taking: Reported on 12/7/2023) 360 tablet 3     nystatin (MYCOSTATIN) powder Apply 1 application  topically 4 (four) times daily.   More than a month    nystatin (MYCOSTATIN) powder Apply topically 2 (two) times daily. 60 g 1

## 2025-06-11 NOTE — PROVATION PATIENT INSTRUCTIONS
Discharge Summary/Instructions after an Endoscopic Procedure  Patient Name: Mona Rosas  Patient MRN: [id number]  Patient YOB: 1980 Wednesday, June 11, 2025  René Murray MD  Dear patient,  As a result of recent federal legislation (The Federal Cures Act), you may   receive lab or pathology results from your procedure in your [facility]   account before your physician is able to contact you. Your physician or   their representative will relay the results to you with their   recommendations at their soonest availability.  Thank you,  RESTRICTIONS:  During your procedure today, you received medications for sedation.  These   medications may affect your judgment, balance and coordination.  Therefore,   for 24 hours, you have the following restrictions:   - DO NOT drive a car, operate machinery, make legal/financial decisions,   sign important papers or drink alcohol.    ACTIVITY:  Today: no heavy lifting, straining or running due to procedural   sedation/anesthesia.  The following day: return to full activity including work.  DIET:  Eat and drink normally unless instructed otherwise.     TREATMENT FOR COMMON SIDE EFFECTS:  - Mild abdominal pain, nausea, belching, bloating or excessive gas:  rest,   eat lightly and use a heating pad.  - Sore Throat: treat with throat lozenges and/or gargle with warm salt   water.  - Because air was used during the procedure, expelling large amounts of air   from your rectum or belching is normal.  - If a bowel prep was taken, you may not have a bowel movement for 1-3 days.    This is normal.  SYMPTOMS TO WATCH FOR AND REPORT TO YOUR PHYSICIAN:  1. Abdominal pain or bloating, other than gas cramps.  2. Chest pain.  3. Back pain.  4. Signs of infection such as: chills or fever occurring within 24 hours   after the procedure.  5. Rectal bleeding, which would show as bright red, maroon, or black stools.   (A tablespoon of blood from the rectum is not serious, especially  if   hemorrhoids are present.)  6. Vomiting.  7. Weakness or dizziness.  GO DIRECTLY TO THE NEAREST EMERGENCY ROOM IF YOU HAVE ANY OF THE FOLLOWING:      Difficulty breathing              Chills and/or fever over 101 F   Persistent vomiting and/or vomiting blood   Severe abdominal pain   Severe chest pain   Black, tarry stools   Bleeding- more than one tablespoon   Any other symptom or condition that you feel may need urgent attention  Your doctor recommends these additional instructions:  If any biopsies were taken, your doctors clinic will contact you in 1 to 2   weeks with any results.  - Discharge patient to home (ambulatory).   - Patient has a contact number available for emergencies.  The signs and   symptoms of potential delayed complications were discussed with the   patient.  Return to normal activities tomorrow.  Written discharge   instructions were provided to the patient.   - Resume previous diet.   - Continue present medications.   - Return to primary care physician as previously scheduled.   - Repeat colonoscopy in 10 years for screening purposes.  For questions, problems or results please call your physician - René Murray MD at Work:  [phone].  [facility], EMERGENCY ROOM PHONE NUMBER: [phone]  IF A COMPLICATION OR EMERGENCY SITUATION ARISES AND YOU ARE UNABLE TO REACH   YOUR PHYSICIAN - GO DIRECTLY TO THE EMERGENCY ROOM.  René Murray MD  6/11/2025 12:04:28 PM  This report has been verified and signed electronically.  Dear patient,  As a result of recent federal legislation (The Federal Cures Act), you may   receive lab or pathology results from your procedure in your [facility]   account before your physician is able to contact you. Your physician or   their representative will relay the results to you with their   recommendations at their soonest availability.  Thank you,  PROVATION

## 2025-06-11 NOTE — TRANSFER OF CARE
"Anesthesia Transfer of Care Note    Patient: Mona Rosas    Procedure(s) Performed: Procedure(s) (LRB):  COLONOSCOPY, SCREENING, LOW RISK PATIENT (N/A)    Patient location: PACU    Anesthesia Type: general    Transport from OR: Transported from OR on room air with adequate spontaneous ventilation    Post pain: adequate analgesia    Post assessment: no apparent anesthetic complications and tolerated procedure well    Post vital signs: stable    Level of consciousness: awake, alert and oriented    Nausea/Vomiting: no nausea/vomiting    Complications: none    Transfer of care protocol was followed      Last vitals: Visit Vitals  BP (!) 169/99   Pulse (!) 56   Temp 36.7 °C (98.1 °F) (Temporal)   Resp 20   Ht 5' 9" (1.753 m)   Wt 107.5 kg (237 lb)   LMP 06/06/2025   SpO2 100%   Breastfeeding No   BMI 35.00 kg/m²     "

## 2025-06-11 NOTE — PLAN OF CARE
Discharge instructions given to patient with verbalization of understanding all.  Written report of procedure provided by MD and given to patient.  VSS; notified Dr Junior of elevated blood pressure, he came to the bedside and assessed.  OK to discharge patient now. Denies n/v and tolerating PO, rates pain level tolerable. IV removed, and family notified for patient discharge home.

## 2025-06-11 NOTE — PLAN OF CARE
Chart reviewed. Preop nursing care completed per orders. Safe surgery checklist complete. Pt denies any open wounds cuts or sores. Pt denies any metal in body. Belongings in locker #6. Waiting for H&P, admission order, procedural consent, and anesthesia consent prior to surgery. Pt AAOX4, VSS on room air. Pt toileted, Bed locked in lowest position, Call light within reach. Pt denies any needs at this time.

## 2025-06-11 NOTE — ANESTHESIA POSTPROCEDURE EVALUATION
Anesthesia Post Evaluation    Patient: Mona Rosas    Procedure(s) Performed: Procedure(s) (LRB):  COLONOSCOPY, SCREENING, LOW RISK PATIENT (N/A)    Final Anesthesia Type: general      Patient location during evaluation: PACU  Patient participation: Yes- Able to Participate  Level of consciousness: awake and alert  Post-procedure vital signs: reviewed and stable  Pain management: adequate  Airway patency: patent    PONV status at discharge: No PONV  Anesthetic complications: no      Cardiovascular status: stable and blood pressure returned to baseline  Respiratory status: spontaneous ventilation  Hydration status: euvolemic  Follow-up not needed.  Comments: Discussed blood pressure with patient. Told her she may need to be on an antihypertensive          Vitals Value Taken Time   /102 06/11/25 12:31   Temp 36.5 °C (97.7 °F) 06/11/25 12:08   Pulse 47 06/11/25 12:36   Resp 12 06/11/25 12:36   SpO2 100 % 06/11/25 12:36   Vitals shown include unfiled device data.      Event Time   Out of Recovery 12:23:00         Pain/Morgan Score: Morgan Score: 9 (6/11/2025 12:23 PM)

## 2025-07-18 ENCOUNTER — ON-DEMAND VIRTUAL (OUTPATIENT)
Dept: URGENT CARE | Facility: CLINIC | Age: 45
End: 2025-07-18
Payer: MEDICAID

## 2025-07-18 ENCOUNTER — NURSE TRIAGE (OUTPATIENT)
Dept: ADMINISTRATIVE | Facility: CLINIC | Age: 45
End: 2025-07-18
Payer: MEDICAID

## 2025-07-18 ENCOUNTER — HOSPITAL ENCOUNTER (EMERGENCY)
Facility: OTHER | Age: 45
Discharge: HOME | End: 2025-07-18
Attending: EMERGENCY MEDICINE
Payer: MEDICAID

## 2025-07-18 ENCOUNTER — OCHSNER VIRTUAL EMERGENCY DEPARTMENT (OUTPATIENT)
Facility: CLINIC | Age: 45
End: 2025-07-18
Payer: MEDICAID

## 2025-07-18 ENCOUNTER — PATIENT OUTREACH (OUTPATIENT)
Facility: OTHER | Age: 45
End: 2025-07-18
Payer: MEDICAID

## 2025-07-18 VITALS
HEART RATE: 62 BPM | DIASTOLIC BLOOD PRESSURE: 90 MMHG | TEMPERATURE: 98 F | BODY MASS INDEX: 35.55 KG/M2 | HEIGHT: 69 IN | SYSTOLIC BLOOD PRESSURE: 182 MMHG | WEIGHT: 240 LBS | OXYGEN SATURATION: 99 % | RESPIRATION RATE: 18 BRPM

## 2025-07-18 DIAGNOSIS — M54.16 LUMBAR RADICULOPATHY: Primary | ICD-10-CM

## 2025-07-18 DIAGNOSIS — M54.10 RADICULOPATHY, UNSPECIFIED SPINAL REGION: Primary | ICD-10-CM

## 2025-07-18 DIAGNOSIS — M79.89 LEG SWELLING: ICD-10-CM

## 2025-07-18 DIAGNOSIS — M25.562 CHRONIC PAIN OF LEFT KNEE: ICD-10-CM

## 2025-07-18 DIAGNOSIS — M25.569 KNEE PAIN: ICD-10-CM

## 2025-07-18 DIAGNOSIS — G89.29 CHRONIC PAIN OF LEFT KNEE: ICD-10-CM

## 2025-07-18 PROCEDURE — 96372 THER/PROPH/DIAG INJ SC/IM: CPT | Performed by: EMERGENCY MEDICINE

## 2025-07-18 PROCEDURE — 99284 EMERGENCY DEPT VISIT MOD MDM: CPT | Mod: 25

## 2025-07-18 PROCEDURE — 63600175 PHARM REV CODE 636 W HCPCS: Mod: JZ,TB | Performed by: EMERGENCY MEDICINE

## 2025-07-18 PROCEDURE — 25000003 PHARM REV CODE 250: Performed by: EMERGENCY MEDICINE

## 2025-07-18 RX ORDER — LIDOCAINE 50 MG/G
1 PATCH TOPICAL
Status: DISCONTINUED | OUTPATIENT
Start: 2025-07-18 | End: 2025-07-18 | Stop reason: HOSPADM

## 2025-07-18 RX ORDER — DICLOFENAC SODIUM 10 MG/G
2 GEL TOPICAL 4 TIMES DAILY
Qty: 150 G | Refills: 0 | Status: SHIPPED | OUTPATIENT
Start: 2025-07-18

## 2025-07-18 RX ORDER — KETOROLAC TROMETHAMINE 30 MG/ML
10 INJECTION, SOLUTION INTRAMUSCULAR; INTRAVENOUS
Status: COMPLETED | OUTPATIENT
Start: 2025-07-18 | End: 2025-07-18

## 2025-07-18 RX ORDER — LIDOCAINE 50 MG/G
1 PATCH TOPICAL DAILY
Qty: 10 PATCH | Refills: 0 | Status: SHIPPED | OUTPATIENT
Start: 2025-07-18

## 2025-07-18 RX ADMIN — LIDOCAINE 1 PATCH: 50 PATCH TOPICAL at 10:07

## 2025-07-18 RX ADMIN — KETOROLAC TROMETHAMINE 9.9 MG: 30 INJECTION, SOLUTION INTRAMUSCULAR; INTRAVENOUS at 10:07

## 2025-07-18 NOTE — PLAN OF CARE-OVED
[facility] Emergency Department Plan of Care Note  Referral Source: Nurse On-Call                               Chief Complaint   Patient presents with    Back Pain    Knee Pain     Back radiating down Left Leg. Left Knee giving out.       Recommendation: Emergency Department            Emergency Department: [facility]               Encounter Diagnosis   Name Primary?    Lumbar radiculopathy Yes

## 2025-07-18 NOTE — PROGRESS NOTES
"Patient contacted on call nurse, reports "L knee swelling and pain as well as lower back pain, she is elevating, icing, using heat pad and not improving. Also used brace, not helping and knee gives out at times. Has not taken any pain meds.10/10 tail bone pain, excruciating, radiates down to L leg Reports that knee and tailbone pain have been ongoing but have now worsened. Says there is no comfortable position for her.   Excruciating pain from tailbone to leg, all the time. Also reports that her L calf has been tight lately but not currently. Denies swelling to L calf. Denies thigh/calf pain currently. Denies injury to knee, back." Consulted Dr. Washington, advised PCP, Scheduled 7/28/25, sooner appointment unavailable at this time. Patient advised ED, reports will go to [facility] today. Adrián follow up scheduled 7/20/25  "

## 2025-07-18 NOTE — TELEPHONE ENCOUNTER
"Pt c/o L knee swelling and pain as well as lower back pain    She is elevating, icing, using heat pad and not improving. Also used brace, not helping and knee gives out at times. Has not taken any pain meds.    10/10 tail bone pain, excruciating, radiates down to L leg  Reports that knee and tailbone pain have been ongoing but have now worsened. Says there is no comfortable position for her.     Excruciating pain from tailbone to leg, all the time  Also reports that her L calf has been tight lately but not currently. Denies swelling to L calf. Denies thigh/calf pain currently.     Denies injury to knee, back    Dispo is got to ED Now (or to Office With Approval)    Sent to [facility] provider on-call who advised, "If PCM has availability today that's fine, she may end up in the ER regardless. I suspect an urgent care will send her to an ED anyway because of her radiculopathy and hypertension." Did look for a PCP appt, nothing available until September. Pt does have a sooner appt booked but MD advised ER.     Pt VU and reports she is going to [facility].  Advised to call back with new or worsening s/s. Pt VU.       Reason for Disposition   SEVERE pain (e.g., excruciating, unable to walk)   Patient sounds very sick or weak to the triager    Additional Information   Negative: Sounds like a life-threatening emergency to the triager   Negative: Swollen knee joint and fever   Negative: Patient sounds very sick or weak to the triager   Negative: Can't move swollen joint at all   Negative: Thigh or calf pain and only 1 side and present > 1 hour   Negative: Thigh or calf swelling and only 1 side   Negative: Knee pain and fever   Negative: Knee redness and fever   Negative: Patient sounds very sick or weak to the triager   Negative: SEVERE pain (e.g., excruciating, unable to walk) and not improved after 2 hours of pain medicine   Negative: Redness and painful when touched and no fever   Negative: Red area or streak > 2 inches " (or 5 cm)   Negative: Thigh or calf pain and only 1 side and present > 1 hour   Negative: Thigh or calf swelling and only 1 side   Negative: SEVERE swelling (e.g., can't move swollen knee at all)   Negative: Passed out (e.g., fainted, lost consciousness, blacked out and was not responding)   Negative: Shock suspected (e.g., cold/pale/clammy skin, too weak to stand, low BP, rapid pulse)   Negative: Sounds like a life-threatening emergency to the triager   Negative: SEVERE back pain of sudden onset and age > 60 years   Negative: SEVERE abdominal pain (e.g., excruciating)   Negative: Abdominal pain and age > 60 years   Negative: Unable to urinate (or only a few drops) and bladder feels very full   Negative: Loss of bladder or bowel control (urine or bowel incontinence; wetting self, leaking stool) of new-onset   Negative: Numbness (loss of sensation) in groin or rectal area   Negative: Pain radiates into groin, scrotum   Negative: Blood in urine (red, pink, or tea-colored)   Negative: Vomiting and pain over lower ribs of back (i.e., flank - kidney area)   Negative: Weakness of a leg or foot (e.g., unable to bear weight, dragging foot)    Protocols used: Knee Pain-A-OH, Knee Swelling-A-OH, Back Pain-A-OH

## 2025-07-18 NOTE — PROGRESS NOTES
Subjective:      Patient ID: Mona Rosas is a 44 y.o. female.    Vitals:  vitals were not taken for this visit.     Chief Complaint: Knee Pain (With swelling )      Visit Type: TELE AUDIOVISUAL    Multiple links to join sent  Multiple chat messages sent to join   Notification sent  Pt No showed    Erroneous encounter

## 2025-07-20 ENCOUNTER — PATIENT OUTREACH (OUTPATIENT)
Facility: OTHER | Age: 45
End: 2025-07-20
Payer: MEDICAID

## 2025-07-20 NOTE — PROGRESS NOTES
Patient contacted [facility] RN on 7/18/25 with c/o knee swelling/pain and lower back pain.  Sherita was consulted, and the disposition recommendation was Primary Care. Appointment scheduled 7/28/25 with Nida Astorga MD [facility], Internal Medicine.  Patient presented to [facility] ED.    Called patient for follow up to ensure healthcare needs were addressed and to assist with any additional needs or concerns.  Patient requested a call back tomorrow.    Second follow up call scheduled 7/21/25.    Sandra Mendoza  ED Navigator

## 2025-07-26 ENCOUNTER — PATIENT OUTREACH (OUTPATIENT)
Facility: OTHER | Age: 45
End: 2025-07-26
Payer: MEDICAID

## 2025-07-26 NOTE — PROGRESS NOTES
Patient outreached to remind about upcoming PCP appointment on 7/28/2025 at 2 pm with Nida Astorga MD, but unable to reach or leave patient a voicemail.  Patient's appointment details sent to patient's email address, [email or web address].    Will follow up with patient during the week of July 29-31, 2025 to assess if patient received the care she was needing or if have any additional concerns/needs to be addressed.

## 2025-07-28 ENCOUNTER — HOSPITAL ENCOUNTER (OUTPATIENT)
Dept: RADIOLOGY | Facility: OTHER | Age: 45
Discharge: HOME | End: 2025-07-28
Attending: STUDENT IN AN ORGANIZED HEALTH CARE EDUCATION/TRAINING PROGRAM
Payer: MEDICAID

## 2025-07-28 ENCOUNTER — CLINICAL SUPPORT (OUTPATIENT)
Dept: REHABILITATION | Facility: HOSPITAL | Age: 45
End: 2025-07-28
Attending: EMERGENCY MEDICINE
Payer: MEDICAID

## 2025-07-28 ENCOUNTER — OFFICE VISIT (OUTPATIENT)
Dept: INTERNAL MEDICINE | Facility: CLINIC | Age: 45
End: 2025-07-28
Payer: MEDICAID

## 2025-07-28 VITALS
HEIGHT: 69 IN | DIASTOLIC BLOOD PRESSURE: 80 MMHG | WEIGHT: 248.44 LBS | HEART RATE: 81 BPM | SYSTOLIC BLOOD PRESSURE: 126 MMHG | OXYGEN SATURATION: 98 % | BODY MASS INDEX: 36.8 KG/M2

## 2025-07-28 DIAGNOSIS — D64.9 ANEMIA, UNSPECIFIED TYPE: ICD-10-CM

## 2025-07-28 DIAGNOSIS — G89.29 CHRONIC MIDLINE LOW BACK PAIN WITH LEFT-SIDED SCIATICA: ICD-10-CM

## 2025-07-28 DIAGNOSIS — Z90.3 S/P GASTRIC SLEEVE PROCEDURE: ICD-10-CM

## 2025-07-28 DIAGNOSIS — M70.52 BURSITIS OF LEFT KNEE, UNSPECIFIED BURSA: ICD-10-CM

## 2025-07-28 DIAGNOSIS — M25.569 KNEE PAIN: ICD-10-CM

## 2025-07-28 DIAGNOSIS — M25.562 ACUTE PAIN OF LEFT KNEE: Primary | ICD-10-CM

## 2025-07-28 DIAGNOSIS — M54.42 CHRONIC MIDLINE LOW BACK PAIN WITH LEFT-SIDED SCIATICA: Primary | ICD-10-CM

## 2025-07-28 DIAGNOSIS — M25.562 CHRONIC PAIN OF LEFT KNEE: ICD-10-CM

## 2025-07-28 DIAGNOSIS — G89.29 CHRONIC PAIN OF LEFT KNEE: ICD-10-CM

## 2025-07-28 DIAGNOSIS — G89.29 CHRONIC MIDLINE LOW BACK PAIN WITH LEFT-SIDED SCIATICA: Primary | ICD-10-CM

## 2025-07-28 DIAGNOSIS — M54.42 CHRONIC MIDLINE LOW BACK PAIN WITH LEFT-SIDED SCIATICA: ICD-10-CM

## 2025-07-28 DIAGNOSIS — M54.10 RADICULOPATHY, UNSPECIFIED SPINAL REGION: ICD-10-CM

## 2025-07-28 PROCEDURE — 72110 X-RAY EXAM L-2 SPINE 4/>VWS: CPT | Mod: TC

## 2025-07-28 PROCEDURE — 3044F HG A1C LEVEL LT 7.0%: CPT | Mod: CPTII,,, | Performed by: STUDENT IN AN ORGANIZED HEALTH CARE EDUCATION/TRAINING PROGRAM

## 2025-07-28 PROCEDURE — 99214 OFFICE O/P EST MOD 30 MIN: CPT | Mod: S$PBB,,, | Performed by: STUDENT IN AN ORGANIZED HEALTH CARE EDUCATION/TRAINING PROGRAM

## 2025-07-28 PROCEDURE — 99214 OFFICE O/P EST MOD 30 MIN: CPT | Mod: PBBFAC,25 | Performed by: STUDENT IN AN ORGANIZED HEALTH CARE EDUCATION/TRAINING PROGRAM

## 2025-07-28 PROCEDURE — 3074F SYST BP LT 130 MM HG: CPT | Mod: CPTII,,, | Performed by: STUDENT IN AN ORGANIZED HEALTH CARE EDUCATION/TRAINING PROGRAM

## 2025-07-28 PROCEDURE — 3008F BODY MASS INDEX DOCD: CPT | Mod: CPTII,,, | Performed by: STUDENT IN AN ORGANIZED HEALTH CARE EDUCATION/TRAINING PROGRAM

## 2025-07-28 PROCEDURE — 72110 X-RAY EXAM L-2 SPINE 4/>VWS: CPT | Mod: 26,,, | Performed by: RADIOLOGY

## 2025-07-28 PROCEDURE — G2211 COMPLEX E/M VISIT ADD ON: HCPCS | Mod: ,,, | Performed by: STUDENT IN AN ORGANIZED HEALTH CARE EDUCATION/TRAINING PROGRAM

## 2025-07-28 PROCEDURE — 1159F MED LIST DOCD IN RCRD: CPT | Mod: CPTII,,, | Performed by: STUDENT IN AN ORGANIZED HEALTH CARE EDUCATION/TRAINING PROGRAM

## 2025-07-28 PROCEDURE — 97162 PT EVAL MOD COMPLEX 30 MIN: CPT | Mod: PN

## 2025-07-28 PROCEDURE — 3079F DIAST BP 80-89 MM HG: CPT | Mod: CPTII,,, | Performed by: STUDENT IN AN ORGANIZED HEALTH CARE EDUCATION/TRAINING PROGRAM

## 2025-07-28 PROCEDURE — 99999 PR PBB SHADOW E&M-EST. PATIENT-LVL IV: CPT | Mod: PBBFAC,,, | Performed by: STUDENT IN AN ORGANIZED HEALTH CARE EDUCATION/TRAINING PROGRAM

## 2025-07-28 RX ORDER — GABAPENTIN 300 MG/1
300 CAPSULE ORAL 3 TIMES DAILY PRN
Qty: 90 CAPSULE | Refills: 11 | Status: SHIPPED | OUTPATIENT
Start: 2025-07-28

## 2025-07-28 RX ORDER — METHOCARBAMOL 750 MG/1
750 TABLET, FILM COATED ORAL 3 TIMES DAILY PRN
Qty: 40 TABLET | Refills: 0 | Status: SHIPPED | OUTPATIENT
Start: 2025-07-28

## 2025-07-28 NOTE — PATIENT INSTRUCTIONS
Stretch at least 2-3 times per day for the next 2 weeks.   Can transition to stretching 1-2 times daily if symptoms improving.  Sit with heating pad on painful areas for 20 minutes prior to stretching.  May also take muscle relaxer prior to applying heat if able.   Avoid stretches that cause overt pain.   Avoid sedating substances such as alcohol or antihistamines with muscles relaxers.   Avoid activities that could be dangerous, such as driving, when taking muscle relaxer.

## 2025-07-28 NOTE — PROGRESS NOTES
"  Outpatient Rehab    Physical Therapy Evaluation (only)    Patient Name: Mona Rosas  MRN: [id number]  YOB: 1980  Encounter Date: 7/28/2025    Therapy Diagnosis:   Encounter Diagnoses   Name Primary?    Knee pain     Radiculopathy, unspecified spinal region     Chronic pain of left knee     Acute pain of left knee Yes     Physician: Rosemarie Sorto MD    Physician Orders: Eval and Treat  Medical Diagnosis: Knee pain  Radiculopathy, unspecified spinal region  Chronic pain of left knee  Surgical Diagnosis: Not applicable for this Episode  Surgical Date: Not applicable for this Episode  Days Since Last Surgery: Not applicable for this Episode    Visit # / Visits Authorized:  1 / 1  Insurance Authorization Period: 7/18/2025 to 7/18/2026  Date of Evaluation: 7/28/2025  Plan of Care Certification: 7/28/2025 to 10/20/25     Time In: 0800   Time Out: 0900  Total Time (in minutes): 60   Total Billable Time (in minutes): 60    Intake Outcome Measure for FOTO Survey    Therapist reviewed FOTO scores for Mona Rosas on 7/28/2025.   FOTO report - see Media section or FOTO account episode details.     Intake Score (%): Not applicable for this Episode    Precautions:       Subjective   History of Present Illness  Mona is a 44 y.o. female      The patient reports a medical diagnosis of Chronic pain of left knee. The patient has experienced this issue since 07/28/24.           Diagnostic tests related to this condition: X-ray.     X-Ray Details: There is no evidence fracture or malalignment.  The adjacent soft tissues are unremarkable.    History of Present Condition/Illness: Patient reports swelling that comes and goes. Patient reports the left knee "goes out". Patient also reports pain in the left "tail bone goes down my leg". Patient reports the pain occurs independently and sometimes that knee and hip pain occur together. Patient reported to Emergency Department  secondary to swelling and pain.  Patient reports " lumbar pain that occurred 2005 while lifting a 5 gallon buckets at work.          Pain     Patient reports a current pain level of 6/10. Pain at best is reported as 2/10. Pain at worst is reported as 10/10.   Location: left knee and hip 10/10 with work+household chores  Clinical Progression (since onset): Worsening  Pain Qualities: Aching, Throbbing, Sharp, Other (Comment)  Other Pain Qualities: sharp pain in back  Pain-Relieving Factors: Heat, Medications - over-the-counter  Pain-Aggravating Factors: Walking, Lifting, Bending, Sitting, Standing         Review of Systems  Patient reports: Headache, Diabetes, and Gallbladder History  Patient denies: Bladder Incontinence, Bowel Incontinence, Chest Pain, Dizziness, Fainting, Fever, Lower Extremity Neurological Deficits, Motion Sickness, Nausea, Night Sweats/Chills, Night Pain, Saddle Numbness, Shortness of Breath, Tinnitus, Weight Gain, Weight Loss, Cancer History, Cardiac History, Immunosuppression History, Kidney History, Osteoarthritis, Recent Infection History, Rheumatoid Arthritis, Stomach History, Trauma History, and Ulcer History        Treatment History  Treatments  Discharged From Past 30 Days: Outpatient therapy  Previously Received Treatments: Yes  Previous Treatments: Physical therapy    Living Arrangements  Living Situation  Housing: Home independently  Living Arrangements: Family members  Support Systems: Family members    Home Setup  Type of Structure: House  Home Access: Level entry  Number of Levels in Home: One level        Employment     Employment Status: Employed full-time   - [employer] ([employer])       Past Medical History/Physical Systems Review:   Mona Rosas  has a past medical history of Anemia and Obesity (BMI 35.0-39.9 without comorbidity).    Mona Rosas  has a past surgical history that includes Ovarian cyst removal (1997); Cholecystectomy (2012); [place] tooth extraction; Laparoscopic sleeve gastrectomy (03/01/2023); and  colonoscopy, screening, low risk patient (N/A, 6/11/2025).    Mona has a current medication list which includes the following prescription(s): diclofenac sodium, ergocalciferol, etonogestrel-ethinyl estradiol, ferrous gluconate, ferrous gluconate, fluticasone propionate, lidocaine, metformin, nystatin, nystatin, and pediatric multivitamin.    Review of patient's allergies indicates:  No Known Allergies     Objective       Knee Swelling  Location of Measurement Right  (cm) Left  (cm)   20 cm Above Joint Line       10 cm Vastus Medialis Oblique       At Joint Line 42 42.5   15 cm Below Joint Line                Lumbar Range of Motion   Active (deg) Passive (deg) Pain   Flexion 55   Yes   Extension 40       Right Lateral Flexion 55   Yes   Right Rotation         Left Lateral Flexion 70       Left Rotation                  Knee Range of Motion   Right Knee   Active (deg) Passive (deg) Pain   Flexion 128       Extension 0           Left Knee   Active (deg) Passive (deg) Pain   Flexion 115       Extension 0                    Knee Patellar Screening       Right Patellar Mobility  Normal: Medial, Lateral, Superior, and Inferior  Left Patellar Mobility  Normal: Medial, Lateral, Superior, and Inferior              Four Stage Balance Test  Narrow Base of Support: 10 sec sec  Tandem Stand - Right Foot in Front: 10 sec  Tandem Stand - Left Foot in Front: 10 sec        Single Leg Stand - Right Foot: 10 sec  Single Leg Stand - Left Foot: 10 sec       Sit to Stand Testing  The patient completed 5 sit to stand transfers in 19 sec.   The patient completed 8 repetitions of a sit to stand transfer in 30 seconds.            Gait Analysis  Base of Support: Normal  Gait Pattern: Antalgic  Walking Speed: Decreased    Right Side Walking Observations  Increased: Stance Time       Left Side Walking Observations  Decreased: Stance Time            Time Entry(in minutes):  PT Evaluation (Moderate) Time Entry: 60    Assessment & Plan    Assessment  Mona presents with a condition of Low complexity.   Presentation of Symptoms: Stable  Will Comorbidities Impact Care: No       Functional Limitations: Activity tolerance, Completing self-care activities, Proprioception, Range of motion, Participating in leisure activities, Pain with ADLs/IADLs, Gross motor coordination  Impairments: Pain with functional activity, Impaired physical strength  Personal Factors Affecting Prognosis: Pain    Prognosis: Excellent  Assessment Details: Patient currently presents to therapy with left piriformis and left knee pain. Patient displays lack of left Knee range of motion and minor swelling. Patient also displays decreased hamstring and piriformis mobility. Patient demonstrates deficits with range of motion, strength, and function that limit ability to participate in school, work, and recreational activities. They would benefit from skilled PT services to normalize kinetic chain mobility, strength, and function to safely return to their prior level of activity.    Plan  From a physical therapy perspective, the patient would benefit from: Skilled Rehab Services    Planned therapy interventions include: Therapeutic exercise, Therapeutic activities, Neuromuscular re-education, Manual therapy, ADLs/IADLs, Other (Comment), and Gait training. Dry Needling (prn)  Planned modalities to include: Biofeedback, Electrical stimulation - attended, Electrical stimulation - passive/unattended, Thermotherapy (hot pack), and Cryotherapy (cold pack).        Visit Frequency: 2 times Per Week for 6 Weeks.       This plan was discussed with Patient.   Discussion participants: Agreed Upon Plan of Care  Plan details: Frequency and duration of treatment to be adjusted as needed          The patient's spiritual, cultural, and educational needs were considered, and the patient is agreeable to the plan of care and goals.           Goals:   Active       LTG       Patient will improve knee flexion  >120 to imrpove gait mechanics        Start:  07/28/25               STG       Patient will display a decrease in left knee edema with right lower extremity  and left lower extremity  at 42cm       Start:  07/28/25                Yoan Isabel PT

## 2025-07-28 NOTE — PROGRESS NOTES
"Subjective:       Patient ID: Mona Rosas is a 44 y.o. female.    Chief Complaint: Chronic midline low back pain with left-sided sciatica [M54.42, G89.29]    Patient is established with me, here today for the following:    History of Present Illness      BACK PAIN:  She reports chronic left-sided back pain originating from a work-related injury in 2005 while lifting a gallon of paint. Pain radiates from buttocks down the entire left leg to the calf. She describes pain as sharp and throbbing, which is constant and unrelieved by changes in position. Pain worsens with overexertion and is progressively worsening compared to previous years. She visited the emergency room approximately 1.5 weeks ago due to severe pain. She started physical therapy today, which included initial assessment and prescribed home stretches. She denies specific activity-related pain triggers and reports pain is consistently present regardless of sitting, standing, or lying positions.    LEFT KNEE SWELLING:  She reports left knee swelling persisting for 4-5 days prior to emergency room visit. Swelling temporarily improves with elevation and ice, but subsequently returns. She experiences occasional knee instability with episodes where the knee "gives out" while walking. She denies any remembered injury or trauma preceding the onset of symptoms. Swelling is localized to the left knee only. She has been using a knee brace at work to provide additional stability.    LABS:  Metabolic panel was normal. Hemoglobin improved from 10.8 to 11.4. MCV was highest in two years. Hematocrit normalized. She reports decreased fatigue and feeling less run down with improved blood counts.    MEDICATIONS:  She takes bariatric multivitamin as prescribed with good adherence. She was prescribed lidocaine patches during ER visit.    SURGICAL HISTORY:  She has a history of prior gastric surgery which precludes oral NSAID administration.    RECENT COLONOSCOPY:  Recent " colonoscopy showed normal results. She was advised to return for follow-up screening in ten years.       ROS:  Musculoskeletal: +joint swelling, +limb swelling, +limb pain, +back pain, +difficulty standing up, +pain with movement          Health maintenance -   Colonoscopy performed 2025. Recommend repeat in 10 years.   Denies family history of colorectal cancer.   Mammogram BI-RADS 1 in .  Denies history of abnormal mammogram.  Family history of breast cancers.  Denies family history of ovarian cancers.  Last pap performed .   Denies history of prior abnormal pap smears.  Denies family history of osteoporosis.  Significant family history of cardiac disease.  UTD on COVID primary, Tdap vaccinations.  Due for COVID vaccinations.  Never smoker.  Denies drug use.  Completed HIV and hepatitis C screening.  Lab Results   Component Value Date    LDLCALC 78 2025   The 10-year ASCVD risk score (Chapin POLLOCK, et al., 2019) is: 5%    Began menarche at age 11.   [id number]  Denies gestational diabetes.  Endorses preeclampsia.   Currently sexually active with .      Prediabetes -  Lab Results   Component Value Date    HGBA1C 5.3 2025    HGBA1C 5.7 (H) 2024    HGBA1C 5.1 2023     Anemia -   Vitamin D deficiency -  Vitamin B12 deficiency -   History of gastric sleeve surgery -   Underwent gastric sleeve with Dr. Escobedo 2023  Lab Results   Component Value Date    HGB 11.4 (L) 2025    HCT 38.0 2025    MCV 86 2025    RDW 15.9 (H) 2025     Lab Results   Component Value Date    IRON 38 2023    TRANSFERRIN 304 2023    TIBC 450 2023    FESATURATED 8 (L) 2023      Lab Results   Component Value Date    FERRITIN 25 2023     Lab Results   Component Value Date    GMKYCXNP81 298 2023     Lab Results   Component Value Date    FOLATE 14.6 2023     Lab Results   Component Value Date    [id number] 28 (L) 2023           "    Current Outpatient Medications   Medication Instructions    diclofenac sodium (VOLTAREN ARTHRITIS PAIN) 2 g, Topical (Top), 4 times daily    ergocalciferol (ERGOCALCIFEROL) 50,000 Units, Every 7 days    etonogestreL-ethinyl estradioL (NUVARING) 0.12-0.015 mg/24 hr vaginal ring 1 each, Vaginal, Every 28 days    ferrous gluconate (FERGON) 324 MG tablet 1 tablet, Every morning    ferrous gluconate (FERGON) 324 mg, Oral, Every Mon, Wed, Fri    fluticasone propionate (FLONASE) 50 mcg, Each Nostril, 2 times daily    gabapentin (NEURONTIN) 300 mg, Oral, 3 times daily PRN    LIDOcaine (LIDODERM) 5 % 1 patch, Transdermal, Daily, Remove & Discard patch within 12 hours or as directed by MD    metFORMIN (GLUCOPHAGE-XR) 1,000 mg, Oral, 2 times daily with meals    methocarbamoL (ROBAXIN) 750 mg, Oral, 3 times daily PRN    nystatin (MYCOSTATIN) powder 1 application , 4 times daily    pediatric multivitamin chewable tablet 1 tablet, Daily     Objective:      Vitals:    07/28/25 1318   BP: 126/80   Pulse: 81   SpO2: 98%   Weight: 112.7 kg (248 lb 7.3 oz)   Height: 5' 9" (1.753 m)   PainSc: 0-No pain     Body mass index is 36.69 kg/m².    Physical Exam  Vitals reviewed.   Constitutional:       General: She is not in acute distress.     Appearance: She is not ill-appearing or diaphoretic.   Pulmonary:      Effort: Pulmonary effort is normal.   Musculoskeletal:      Left knee: Swelling (pes anserine and infratellar bursa) present. No bony tenderness or crepitus. Normal range of motion. No tenderness.      Instability Tests: Anterior drawer test negative. Posterior drawer test negative. Medial Raphael test negative and lateral Raphael test negative.      Comments:   LUIS positive left, negative right  Straight leg raise positive left, negative right  TTP upper gluteal region left, non-TTP right  Non-TTP SI joint bilaterally  Non-TTP trochanteric bursa bilaterally  Pain with forward flexion  No pain with facet loading   Hip " flexion left 5/5 right 5/5  Knee extension left 5/5 right 5/5   Knee flexion left 5/5 right 5/5      Skin:     General: Skin is warm and dry.   Neurological:      Mental Status: She is alert. Mental status is at baseline.   Psychiatric:         Mood and Affect: Mood normal.         Behavior: Behavior normal.         Assessment:       1. Chronic midline low back pain with left-sided sciatica    2. Bursitis of left knee, unspecified bursa    3. Anemia, unspecified type    4. S/P gastric sleeve procedure        Plan:   Chronic midline low back pain with left-sided sciatica  -     X-Ray Lumbar Spine Ap Lateral w/Flex Ext; Future; Expected date: 07/28/2025  -     gabapentin (NEURONTIN) 300 MG capsule; Take 1 capsule (300 mg total) by mouth 3 (three) times daily as needed (sciatica).  Dispense: 90 capsule; Refill: 11  -     methocarbamoL (ROBAXIN) 750 MG Tab; Take 1 tablet (750 mg total) by mouth 3 (three) times daily as needed (sciatica).  Dispense: 40 tablet; Refill: 0    Bursitis of left knee, unspecified bursa    Anemia, unspecified type    S/P gastric sleeve procedure        Assessment & Plan      PLAN SUMMARY:  - Ordered XR Lower Back with flexion and extension views  - Prescribed Gabapentin 300 mg for nerve-based pain, up to 3 times daily  - Prescribed muscle relaxant for back pain  - Referred to physical therapy for knee bursitis and sciatic nerve irritation  - Instructed patient to perform prescribed stretches and exercises  - Advised to continue bariatric multivitamin containing iron, B12, and Vitamin D  - Recommend conservative management with ice, elevation, and anti-inflammatories for knee  - Advised to use heating pad for muscle tension  - Awaiting further labs results for iron, ferritin, and B12 levels    ## SCIATICA, LEFT SIDE:  - Examined patient and found tension in the left lower back and buttock, positive for sciatica.  - Mona reports pain starting in the buttocks and radiating down the left leg to the  calf, described as sharp and throbbing, triggered by overexertion and worsening when sitting or lying down.  - Diagnosed likely sciatic nerve irritation, possibly due to muscle tension.  - Ordered XR Lower Back with flexion and extension views as initial imaging to assess.  - Will consider MRI if no improvement after physical therapy.  - Prescribed Gabapentin 300 mg for nerve-based pain, to be taken up to 3 times daily, starting with nighttime dose and gradually increasing as tolerated.  - Advised patient to apply heat to affected area before physical therapy sessions.  - Instructed patient to perform prescribed stretches and exercises from therapist.  - Explained sciatic nerve anatomy and potential causes of irritation.    ## BURSITIS, LEFT KNEE:  - Mona reports left knee swelling for 4-5 days, with swelling reducing with ice and elevation but flaring up again.  - Observed swelling in the pes anserine and infrapatellar bursa of the left knee, with no significant warmth or redness.  - Recommend conservative management with ice, elevation, and anti-inflammatories.  - Continue physical therapy to help with knee bursitis.  - Advised patient to see an orthopedist if swelling persists or worsens, as fluid drainage may be necessary.    ## ANEMIA:  - Mona's hemoglobin increased from 10.8 to 11.4, and MCV is the highest in 2 years.  - Mona reports less fatigue and cold intolerance with improved blood counts.  - Assessed lab results, noting improvement in hemoglobin and hematocrit levels.  - Awaiting further labs results for iron, ferritin, and B12 levels to assess anemia status.  - Advised patient to continue bariatric multivitamin containing iron.        32 minutes were spent in chart review, documentation and review of results, and evaluation, treatment, and counseling of patient on the same day of service.    Nida Astorga MD      7/28/2025

## 2025-07-31 ENCOUNTER — CLINICAL SUPPORT (OUTPATIENT)
Dept: REHABILITATION | Facility: HOSPITAL | Age: 45
End: 2025-07-31
Payer: MEDICAID

## 2025-07-31 DIAGNOSIS — M25.562 ACUTE PAIN OF LEFT KNEE: Primary | ICD-10-CM

## 2025-07-31 PROCEDURE — 97110 THERAPEUTIC EXERCISES: CPT | Mod: PN

## 2025-07-31 NOTE — PROGRESS NOTES
Outpatient Rehab    Physical Therapy Visit    Patient Name: Mona Rosas  MRN: [id number]  YOB: 1980  Encounter Date: 7/31/2025    Therapy Diagnosis:   Encounter Diagnosis   Name Primary?    Acute pain of left knee Yes     Physician: Rosemarie Sorto MD    Physician Orders: Eval and Treat  Medical Diagnosis: Knee pain  Radiculopathy, unspecified spinal region  Chronic pain of left knee  Surgical Diagnosis: Not applicable for this Episode   Surgical Date: Not applicable for this Episode  Days Since Last Surgery: Not applicable for this Episode    Visit # / Visits Authorized:  1 / 20  Insurance Authorization Period: 7/28/2025 to 12/31/2025  Date of Evaluation: 7/28/2025  Plan of Care Certification: 7/28/2025 to 10/20/2025      PT/PTA: PT   Number of PTA visits since last PT visit:0  Time In: 0800   Time Out: 0900  Total Time (in minutes): 60   Total Billable Time (in minutes): 60    FOTO:  Intake Score (%): Not applicable for this Episode  Survey Score 2 (%): Not applicable for this Episode  Survey Score 3 (%): Not applicable for this Episode    Precautions:         Subjective   HEP complinace.  Pain reported as 6/10.      Objective            Treatment:  Therapeutic Exercise  TE 1: GSS - 3  TE 2: long sitting HSS - 3'  TE 3: Long sitting HSS with strap - 3'  TE 4: doorway LAT stretch - 3'  TE 5: single to chest - 30 reps  TE 8: piriformis stretch -3'  TE 9: sit to stand - 30 reps  TE 10: clam shells with GTB - 30 reps      Time Entry(in minutes):  Therapeutic Exercise Time Entry: 30    Assessment & Plan   Assessment: Patient presents to therapy with reports of continued pain with functional movement. Patient however was able to perform stretching activity to improve mobility. Patient reports a reduction in pain following. Patient will continue to benefit from skilled therapy to address deficits.        The patient will continue to benefit from skilled outpatient physical therapy in order to address the  deficits listed in the problem list on the initial evaluation, provide patient and family education, and maximize the patients level of independence in the home and community environments.     The patient's spiritual, cultural, and educational needs were considered, and the patient is agreeable to the plan of care and goals.           Plan: Continue with current POC    Goals:   Active       LTG       Patient will improve knee flexion >120 to imrpove gait mechanics  (Progressing)       Start:  07/28/25               STG       Patient will display a decrease in left knee edema with right lower extremity  and left lower extremity  at 42cm (Progressing)       Start:  07/28/25                Yoan Isabel, PT

## 2025-08-04 ENCOUNTER — CLINICAL SUPPORT (OUTPATIENT)
Dept: REHABILITATION | Facility: HOSPITAL | Age: 45
End: 2025-08-04
Payer: MEDICAID

## 2025-08-04 DIAGNOSIS — M25.562 ACUTE PAIN OF LEFT KNEE: Primary | ICD-10-CM

## 2025-08-04 PROCEDURE — 97110 THERAPEUTIC EXERCISES: CPT | Mod: PN,CQ

## 2025-08-04 NOTE — PROGRESS NOTES
Outpatient Rehab    Physical Therapy Visit    Patient Name: Mona Rosas  MRN: [id number]  YOB: 1980  Encounter Date: 8/4/2025    Therapy Diagnosis:   Encounter Diagnosis   Name Primary?    Acute pain of left knee Yes     Physician: Rosemarie Sorto MD    Physician Orders: Eval and Treat  Medical Diagnosis: Knee pain  Radiculopathy, unspecified spinal region  Chronic pain of left knee  Surgical Diagnosis: Not applicable for this Episode   Surgical Date: Not applicable for this Episode  Days Since Last Surgery: Not applicable for this Episode    Visit # / Visits Authorized:  2 / 10  Insurance Authorization Period: 7/28/2025 to 9/15/2025  Date of Evaluation: 7/28/2025  Plan of Care Certification: 7/28/2025 to 10/20/2025      PT/PTA: PTA   Number of PTA visits since last PT visit:1  Time In: 0800   Time Out: 0900  Total Time (in minutes): 60   Total Billable Time (in minutes): 60    FOTO:  Intake Score (%): Not applicable for this Episode  Survey Score 2 (%): Not applicable for this Episode  Survey Score 3 (%): Not applicable for this Episode    Precautions:         Subjective   Pain not so much in the knee but in the posterior hip and tailbone.  Pain reported as 6/10.      Objective            Treatment:  Therapeutic Exercise  TE 1: GSS - 3  TE 2: long sitting HSS - 3'  TE 5: single to chest - 30 reps  TE 6: double knee to chest - 30 reps  TE 8: piriformis stretch -3'  TE 9: Reverse clamshells 3x10 RTB  TE 10: clam shells with GTB - 30 reps      Time Entry(in minutes):  Therapeutic Exercise Time Entry: 60    Assessment & Plan   Assessment: Session tolerated well. Pt reported decreased discomfort at th end of the session. Vcs used to improve ther ex technique. Mona will continue to benefit from skilled therapy.   Evaluation/Treatment Tolerance: Patient tolerated treatment well    The patient will continue to benefit from skilled outpatient physical therapy in order to address the deficits listed in the  problem list on the initial evaluation, provide patient and family education, and maximize the patients level of independence in the home and community environments.     The patient's spiritual, cultural, and educational needs were considered, and the patient is agreeable to the plan of care and goals.           Plan: Continue with current POC    Goals:   Active       LTG       Patient will improve knee flexion >120 to imrpove gait mechanics  (Progressing)       Start:  07/28/25               STG       Patient will display a decrease in left knee edema with right lower extremity  and left lower extremity  at 42cm (Progressing)       Start:  07/28/25                Judi Hernandez PTA

## 2025-08-11 ENCOUNTER — CLINICAL SUPPORT (OUTPATIENT)
Dept: REHABILITATION | Facility: HOSPITAL | Age: 45
End: 2025-08-11
Payer: MEDICAID

## 2025-08-11 DIAGNOSIS — M25.562 ACUTE PAIN OF LEFT KNEE: Primary | ICD-10-CM

## 2025-08-11 PROCEDURE — 97110 THERAPEUTIC EXERCISES: CPT | Mod: PN,CQ

## 2025-08-14 ENCOUNTER — CLINICAL SUPPORT (OUTPATIENT)
Dept: REHABILITATION | Facility: HOSPITAL | Age: 45
End: 2025-08-14
Payer: MEDICAID

## 2025-08-14 DIAGNOSIS — M25.562 ACUTE PAIN OF LEFT KNEE: Primary | ICD-10-CM

## 2025-08-14 PROCEDURE — 97110 THERAPEUTIC EXERCISES: CPT | Mod: PN,CQ

## 2025-08-20 ENCOUNTER — CLINICAL SUPPORT (OUTPATIENT)
Dept: REHABILITATION | Facility: HOSPITAL | Age: 45
End: 2025-08-20
Payer: MEDICAID

## 2025-08-20 DIAGNOSIS — M25.562 ACUTE PAIN OF LEFT KNEE: Primary | ICD-10-CM

## 2025-08-20 PROCEDURE — 97110 THERAPEUTIC EXERCISES: CPT | Mod: PN
